# Patient Record
Sex: FEMALE | Race: WHITE
[De-identification: names, ages, dates, MRNs, and addresses within clinical notes are randomized per-mention and may not be internally consistent; named-entity substitution may affect disease eponyms.]

---

## 2018-04-22 ENCOUNTER — HOSPITAL ENCOUNTER (EMERGENCY)
Dept: HOSPITAL 88 - ER | Age: 78
Discharge: HOME | End: 2018-04-22
Payer: COMMERCIAL

## 2018-04-22 VITALS — BODY MASS INDEX: 43.36 KG/M2 | HEIGHT: 64 IN | WEIGHT: 254 LBS

## 2018-04-22 DIAGNOSIS — L03.313: Primary | ICD-10-CM

## 2018-04-22 DIAGNOSIS — Z95.1: ICD-10-CM

## 2018-04-22 DIAGNOSIS — L02.213: ICD-10-CM

## 2018-04-22 PROCEDURE — 99282 EMERGENCY DEPT VISIT SF MDM: CPT

## 2018-12-30 ENCOUNTER — HOSPITAL ENCOUNTER (INPATIENT)
Dept: HOSPITAL 88 - ER | Age: 78
LOS: 5 days | Discharge: ANOTHER HOSPITAL | DRG: 441 | End: 2019-01-04
Attending: INTERNAL MEDICINE | Admitting: INTERNAL MEDICINE
Payer: MEDICARE

## 2018-12-30 VITALS — HEIGHT: 64 IN | BODY MASS INDEX: 47.29 KG/M2 | WEIGHT: 277.01 LBS

## 2018-12-30 DIAGNOSIS — R18.8: ICD-10-CM

## 2018-12-30 DIAGNOSIS — R41.0: ICD-10-CM

## 2018-12-30 DIAGNOSIS — R53.81: ICD-10-CM

## 2018-12-30 DIAGNOSIS — N18.3: ICD-10-CM

## 2018-12-30 DIAGNOSIS — I25.10: ICD-10-CM

## 2018-12-30 DIAGNOSIS — E87.1: ICD-10-CM

## 2018-12-30 DIAGNOSIS — K75.81: Primary | ICD-10-CM

## 2018-12-30 DIAGNOSIS — N39.0: ICD-10-CM

## 2018-12-30 DIAGNOSIS — E66.9: ICD-10-CM

## 2018-12-30 DIAGNOSIS — R19.7: ICD-10-CM

## 2018-12-30 DIAGNOSIS — B96.20: ICD-10-CM

## 2018-12-30 DIAGNOSIS — K72.00: ICD-10-CM

## 2018-12-30 DIAGNOSIS — Z82.49: ICD-10-CM

## 2018-12-30 DIAGNOSIS — Z95.1: ICD-10-CM

## 2018-12-30 DIAGNOSIS — K76.6: ICD-10-CM

## 2018-12-30 DIAGNOSIS — N17.9: ICD-10-CM

## 2018-12-30 DIAGNOSIS — G62.9: ICD-10-CM

## 2018-12-30 LAB
ALBUMIN SERPL-MCNC: 2.3 G/DL (ref 3.5–5)
ALBUMIN/GLOB SERPL: 0.7 {RATIO} (ref 0.8–2)
ALP SERPL-CCNC: 221 IU/L (ref 40–150)
ALT SERPL-CCNC: 274 IU/L (ref 0–55)
ANION GAP SERPL CALC-SCNC: 15.9 MMOL/L (ref 8–16)
BACTERIA URNS QL MICRO: (no result) /HPF
BASOPHILS # BLD AUTO: 0.1 10*3/UL (ref 0–0.1)
BASOPHILS NFR BLD AUTO: 0.6 % (ref 0–1)
BILIRUB UR QL: (no result)
BNP BLD-MCNC: 418.4 PG/ML (ref 0–100)
BUN SERPL-MCNC: 42 MG/DL (ref 7–26)
BUN/CREAT SERPL: 27 (ref 6–25)
CALCIUM SERPL-MCNC: 8.5 MG/DL (ref 8.4–10.2)
CHLORIDE SERPL-SCNC: 100 MMOL/L (ref 98–107)
CLARITY UR: (no result)
CO2 SERPL-SCNC: 20 MMOL/L (ref 22–29)
COLOR UR: YELLOW
DEPRECATED NEUTROPHILS # BLD AUTO: 6.7 10*3/UL (ref 2.1–6.9)
DEPRECATED RBC URNS MANUAL-ACNC: (no result) /HPF (ref 0–5)
EGFRCR SERPLBLD CKD-EPI 2021: 33 ML/MIN (ref 60–?)
EOSINOPHIL # BLD AUTO: 0.2 10*3/UL (ref 0–0.4)
EOSINOPHIL NFR BLD AUTO: 1.7 % (ref 0–6)
EPI CELLS URNS QL MICRO: (no result) /LPF
ERYTHROCYTE [DISTWIDTH] IN CORD BLOOD: 16.4 % (ref 11.7–14.4)
GLOBULIN PLAS-MCNC: 3.5 G/DL (ref 2.3–3.5)
GLUCOSE SERPLBLD-MCNC: 102 MG/DL (ref 74–118)
HCT VFR BLD AUTO: 37.4 % (ref 34.2–44.1)
HGB BLD-MCNC: 12.3 G/DL (ref 12–16)
KETONES UR QL STRIP.AUTO: (no result)
LEUKOCYTE ESTERASE UR QL STRIP.AUTO: (no result)
LIPASE SERPL-CCNC: 66 U/L (ref 8–78)
LYMPHOCYTES # BLD: 1.9 10*3/UL (ref 1–3.2)
LYMPHOCYTES NFR BLD AUTO: 18.3 % (ref 18–39.1)
MCH RBC QN AUTO: 31.9 PG (ref 28–32)
MCHC RBC AUTO-ENTMCNC: 32.9 G/DL (ref 31–35)
MCV RBC AUTO: 96.9 FL (ref 81–99)
MONOCYTES # BLD AUTO: 1.6 10*3/UL (ref 0.2–0.8)
MONOCYTES NFR BLD AUTO: 15.5 % (ref 4.4–11.3)
NEUTS SEG NFR BLD AUTO: 63.7 % (ref 38.7–80)
NITRITE UR QL STRIP.AUTO: NEGATIVE
PLATELET # BLD AUTO: 112 X10E3/UL (ref 140–360)
POTASSIUM SERPL-SCNC: 4.9 MMOL/L (ref 3.5–5.1)
PROT UR QL STRIP.AUTO: (no result)
RBC # BLD AUTO: 3.86 X10E6/UL (ref 3.6–5.1)
SODIUM SERPL-SCNC: 131 MMOL/L (ref 136–145)
SP GR UR STRIP: 1.02 (ref 1.01–1.02)
UROBILINOGEN UR STRIP-MCNC: 1 MG/DL (ref 0.2–1)
WBC #/AREA URNS HPF: (no result) /HPF (ref 0–5)

## 2018-12-30 PROCEDURE — 82140 ASSAY OF AMMONIA: CPT

## 2018-12-30 PROCEDURE — 83690 ASSAY OF LIPASE: CPT

## 2018-12-30 PROCEDURE — 80053 COMPREHEN METABOLIC PANEL: CPT

## 2018-12-30 PROCEDURE — 87086 URINE CULTURE/COLONY COUNT: CPT

## 2018-12-30 PROCEDURE — 76770 US EXAM ABDO BACK WALL COMP: CPT

## 2018-12-30 PROCEDURE — 36415 COLL VENOUS BLD VENIPUNCTURE: CPT

## 2018-12-30 PROCEDURE — 74470 X-RAY EXAM OF KIDNEY LESION: CPT

## 2018-12-30 PROCEDURE — 83735 ASSAY OF MAGNESIUM: CPT

## 2018-12-30 PROCEDURE — 80076 HEPATIC FUNCTION PANEL: CPT

## 2018-12-30 PROCEDURE — 87186 SC STD MICRODIL/AGAR DIL: CPT

## 2018-12-30 PROCEDURE — 82105 ALPHA-FETOPROTEIN SERUM: CPT

## 2018-12-30 PROCEDURE — 97139 UNLISTED THERAPEUTIC PX: CPT

## 2018-12-30 PROCEDURE — 83605 ASSAY OF LACTIC ACID: CPT

## 2018-12-30 PROCEDURE — 74176 CT ABD & PELVIS W/O CONTRAST: CPT

## 2018-12-30 PROCEDURE — 82570 ASSAY OF URINE CREATININE: CPT

## 2018-12-30 PROCEDURE — 99285 EMERGENCY DEPT VISIT HI MDM: CPT

## 2018-12-30 PROCEDURE — 81001 URINALYSIS AUTO W/SCOPE: CPT

## 2018-12-30 PROCEDURE — 76937 US GUIDE VASCULAR ACCESS: CPT

## 2018-12-30 PROCEDURE — 51700 IRRIGATION OF BLADDER: CPT

## 2018-12-30 PROCEDURE — 87493 C DIFF AMPLIFIED PROBE: CPT

## 2018-12-30 PROCEDURE — 83880 ASSAY OF NATRIURETIC PEPTIDE: CPT

## 2018-12-30 PROCEDURE — 93005 ELECTROCARDIOGRAM TRACING: CPT

## 2018-12-30 PROCEDURE — 36556 INSERT NON-TUNNEL CV CATH: CPT

## 2018-12-30 PROCEDURE — 84300 ASSAY OF URINE SODIUM: CPT

## 2018-12-30 PROCEDURE — 85610 PROTHROMBIN TIME: CPT

## 2018-12-30 PROCEDURE — 80048 BASIC METABOLIC PNL TOTAL CA: CPT

## 2018-12-30 PROCEDURE — 85730 THROMBOPLASTIN TIME PARTIAL: CPT

## 2018-12-30 PROCEDURE — 84156 ASSAY OF PROTEIN URINE: CPT

## 2018-12-30 PROCEDURE — 85025 COMPLETE CBC W/AUTO DIFF WBC: CPT

## 2018-12-30 PROCEDURE — 96361 HYDRATE IV INFUSION ADD-ON: CPT

## 2018-12-30 PROCEDURE — 71045 X-RAY EXAM CHEST 1 VIEW: CPT

## 2018-12-30 PROCEDURE — 82248 BILIRUBIN DIRECT: CPT

## 2018-12-30 PROCEDURE — 71046 X-RAY EXAM CHEST 2 VIEWS: CPT

## 2018-12-30 NOTE — XMS REPORT
Clinical Summary

                             Created on: 2018



Merly Phoenix

External Reference #: [id number]

: 1940

Sex: Female



Demographics







                          Address                   [address] [place]  [address]

 

                          Home Phone                [phone]

 

                          Preferred Language        English

 

                          Marital Status            Unknown

 

                          Evangelical Affiliation     Confucianist

 

                          Race                      White

 

                          Ethnic Group              Non-





Author







                          Author                    SANDRA D[employer]er

 

                          Organization              [facility] [employer] [facility]

 

                          Address                   Unknown

 

                          Phone                     Unavailable







Support







                Name            Relationship    Address         Phone

 

                    [employer]       [employer]                [address] [place]  [address]               [phone]







Care Team Providers







                    Care Team Member Name    Role                Phone

 

                    Dennys Dior MD    PCP                 [phone]







Allergies

No Known Allergies



Medications







                          End Date                  Status



              Medication     Sig          Dispensed     Refills      Start  



                                         Date  

 

                                                    Active



                     aspirin 81 MG chewable     Take 81 mg by       0   



                           tablet                    mouth daily.     

 

                                                    Active



                     atorvastatin (LIPITOR) 80     Take 80 mg by       0   



                           MG tablet                 mouth daily.     

 

                                                    Active



                     budesonide-formoterol     Inhale 2            0   



                           (SYMBICORT) 160-4.5       puffs by     



                           mcg/actuation inhaler     mouth via     



                                         inhaler 2     



                                         (two) times     



                                         daily.     

 

                                                    Active



                     gabapentin (NEURONTIN)     Take 300 mg         0   



                           300 MG capsule            by mouth 3     



                                         (three) times     



                                         daily .     

 

                                                    Active



                     ranitidine (ZANTAC) 150     Take 150 mg         0   



                           MG capsule                by mouth 2     



                                         (two) times     



                                         daily.     

 

                                                    Active



                     bisoprolol (ZEBETA) 5 MG     Take 5 mg by        0   



                           tablet                    mouth 2 (two)     



                                         times daily.     

 

                                                    Active



                     lactulose (CHRONULAC) 10     Take 20 g by        0   



                           gram/15 mL (15 mL)        mouth 2 (two)     



                           solution                  times daily.     

 

                                                    Active



                     cholecalciferol, vitamin     Take 1 tablet       0   



                           D3, 5,000 unit Tab        by mouth     



                                         daily.     

 

                                                    Active



                     cyanocobalamin (VITAMIN     Take 1 tablet       0   



                           B-12) 1000 MCG tablet     by mouth     



                                         daily.     

 

                                                    Active



                     C,E,zinc,copper     Take 1 tablet       0   



                           11/qhkce9h/lut (OCUVITE     by mouth     



                           ADULT 50 PLUS ORAL)       daily.     

 

                                                    Active



                 latanoprost (XALATAN)     Place 1 drop      0               /201  



                     0.005 % ophthalmic     into both           8  



                           solution                  eyes daily.     

 

                                                    Active



                 loratadine (CLARITIN) 10     Take 1 tablet      0               /201  



                     mg tablet           by mouth            6  



                                         daily as     



                                         needed for     



                                         Allergies.     

 

                                                    Active



                 nitroglycerin (NITROSTAT)     Take 1 tablet      0                 



                     0.4 MG SL tablet     by mouth as         8  



                                         needed for     



                                         Chest pain     



                                         Dissolve one     



                                         to two     



                                         tablets under     



                                         the tongue as     



                                         needed up to     



                                         3 times. If     



                                         not relieved,     



                                         please call     



                                         [phone].     

 

                          2018                Discontinued



                     acetaminophen-codeine     Take 1 tablet       0   



                           (TYLENOL #3) 300-30 mg     by mouth     



                           per tablet                every 4     



                                         (four) hours     



                                         as needed for     



                                         Pain.     

 

                          2018                Discontinued



                     clopidogrel (PLAVIX) 75     Take 75 mg by       0   



                           mg tablet                 mouth daily.     

 

                          2018                Discontinued



                     losartan-hydroCHLOROthiaz     Take 1 tablet       0   



                           pa (HYZAAR) 50-12.5 mg     by mouth     



                           per tablet                daily.     

 

                          2018                Discontinued



                     spironolactone      Take 25 mg by       0   



                           (ALDACTONE) 25 MG tablet     mouth daily.     

 

                          2018                Discontinued



                 ondansetron (ZOFRAN ODT)     Take 4 mg by      0               10/11/201  



                     4 MG disintegrating     mouth every 8       8  



                           tablet                    (eight) hours     



                                         as needed for     



                                         Nausea.     

 

                          2018                



              levoFLOXacin (LEVAQUIN)     Take 1 tablet     2 tablet     0              



                     250 MG tablet       (250 mg             8  



                                         total) by     



                                         mouth daily     



                                         for 2 days.     







Active Problems







 



                           Problem                   Noted Date

 

 



                           Hepatic encephalopathy     2018

 

 



                           Visual hallucinations     2018

 

 



                           Acute UTI                 2018

 

 



                           Acute vomiting            2018

 

 



                           Cirrhosis                 2018







Encounters







                          Care Team                 Description



                     Date                Type                Specialty  

 

                                        



German Abdalla MD                         Liver lesion; 

Abnormal ultrasound



                     2018          Hospital            Radiology  



                                         Encounter   

 

                                        



German Abdalla MD                         Liver lesion (Primary Dx); 

Abnormal ultrasound



                     12/10/2018          Outside Orders      Radiology  

 

                                                     



                           2018                Travel   

 

                                        



Gracie Carrillo MD Udayamurthy, Anita, MD                  Acute vomiting (Primary Dx); 

Cirrhosis of liver without ascites, unspecified hepatic cirrhosis type (HCC); 

Hepatic encephalopathy (HCC); 

Visual hallucinations; 

Intractable cyclical vomiting with nausea; 

Tongue wound, initial encounter; 

Serotonin syndrome; 

Acute urinary tract infection; 

Acute UTI



                     2018          Hospital            General Internal Medicine  



                           -                         Encounter   



                                         2018          Orders Only         General Internal Medicine  

 

                                                     



                           2018                Travel   



after 2017



Family History







   



                 Medical History     Relation        Name            Comments

 

   



                           Aneurysm                  Father  

 

   



                           Cancer                    Mother  









   



                 Relation        Name            Status          Comments

 

   



                                         Father   

 

   



                                         Mother   







Social History







                                        Date



                 Tobacco Use     Types           Packs/Day       Years Used 

 

                                         



                                         Never Smoker    

 

    



                                         Smokeless Tobacco: Never   



                                         Used   









   



                 Alcohol Use     Drinks/Week     oz/Week         Comments

 

   



                                         No   









  



                     Alcohol Habits      Answer              Date Recorded

 

  



                     How often do you have a drink containing alcohol?     Never               2018

 

  



                           How many drinks containing alcohol do you have on     Not asked 



                                         a typical day when you are drinking?  

 

  



                           How often do you have six or more drinks on one     Not asked 



                                         occasion?  









 



                           Sex Assigned at Birth     Date Recorded

 

 



                                         Not on file 









                                        Industry



                           Job Start Date            Occupation 

 

                                        Not on file



                           Not on file               Not on file 









                                        Travel End



                           Travel History            Travel Start 

 





                                         No recent travel history available.







Last Filed Vital Signs







                                        Time Taken



                           Vital Sign                Reading 

 

                                        2018 10:41 AM CST



                           Blood Pressure            134/59 

 

                                        2018 10:41 AM CST



                           Pulse                     77 

 

                                        2018 10:41 AM CST



                           Temperature               36.6 C (97.8 F) 

 

                                        2018 10:41 AM CST



                           Respiratory Rate          18 

 

                                        2018 10:41 AM CST



                           Oxygen Saturation         94% 

 

                                        2018 10:11 PM CST



                           Inhaled Oxygen            21% 



                                         Concentration  

 

                                        2018 11:14 PM CST



                           Weight                    113.4 kg (250 lb) 

 

                                        2018 11:14 PM CST



                           Height                    162.6 cm (5' 4") 

 

                                        2018 11:14 PM CST



                           Body Mass Index           42.91 







Plan of Treatment





Not on file



Procedures







                                        Comments



                 Procedure Name     Priority        Date/Time       Associated Diagnosis 

 

                                        



Results for this procedure are in the results section.



                 MR ABDOMEN WITH/WITHOUT     Routine         2018      Liver lesion 



                     IV CONTRAST         3:20 PM CST         Abnormal ultrasound 

 

                                        



Results for this procedure are in the results section.



                     POCT-CREATININE     Routine             2018  



                                         2:29 PM CST  

 

                                        



Results for this procedure are in the results section.



                     AMMONIA             Routine             2018  



                                         5:13 AM CST  

 

                                        



Results for this procedure are in the results section.



                     HEPATIC FUNCTION PANEL     Routine             2018  



                                         5:13 AM CST  

 

                                        



Results for this procedure are in the results section.



                     BASIC METABOLIC PANEL (7)     Routine             2018  



                                         5:13 AM CST  

 

                                        



Results for this procedure are in the results section.



                     CBC W/PLT COUNT & AUTO     Routine             2018  



                           DIFFERENTIAL              5:12 AM CST  

 

                                        



Results for this procedure are in the results section.



                     CBC W/PLT COUNT & AUTO     Routine             2018  



                           DIFFERENTIAL              5:12 AM CST  

 

                                        



Results for this procedure are in the results section.



                     POCT-LACTIC ACID, VENOUS     Routine             2018  



                                         7:16 AM CST  

 

                                        



Results for this procedure are in the results section.



                     CBC W/PLT COUNT & AUTO     Routine             2018  



                           DIFFERENTIAL              5:21 AM CST  

 

                                        



Results for this procedure are in the results section.



                     CBC W/PLT COUNT & AUTO     Routine             2018  



                           DIFFERENTIAL              5:21 AM CST  

 

                                        



Results for this procedure are in the results section.



                     HEPATIC FUNCTION PANEL     Routine             2018  



                                         5:21 AM CST  

 

                                        



Results for this procedure are in the results section.



                     BASIC METABOLIC PANEL (7)     Routine             2018  



                                         5:21 AM CST  

 

                                        



Results for this procedure are in the results section.



                     US ABDOMEN COMPLETE     STAT                2018  



                                         4:10 AM CST  

 

                                        



Results for this procedure are in the results section.



                     XR CHEST 1 VIEW     STAT                2018  



                           PORTABLE/BEDSIDE          3:27 AM CST  

 

                                        



Results for this procedure are in the results section.



                     CT BRAIN WITHOUT IV     STAT                2018  



                           CONTRAST                  2:32 AM CST  

 

                                        



Results for this procedure are in the results section.



                     APTT                STAT                2018  



                                         12:42 AM CST  

 

                                        



Results for this procedure are in the results section.



                     PROTHROMBIN TIME/INR     STAT                2018  



                                         12:42 AM CST  

 

                                        



Results for this procedure are in the results section.



                     AMMONIA             STAT                2018  



                                         12:35 AM CST  

 

                                        



Results for this procedure are in the results section.



                     CBC W/PLT COUNT & AUTO     STAT                2018  



                           DIFFERENTIAL              12:06 AM CST  

 

                                        



Results for this procedure are in the results section.



                     BLOOD GAS, VENOUS     STAT                2018  



                                         12:06 AM CST  

 

                                        



Results for this procedure are in the results section.



                     LACTIC ACID, VENOUS,     STAT                2018  



                           WHOLE BLOOD               12:06 AM CST  

 

                                        



Results for this procedure are in the results section.



                     PHOSPHORUS          STAT                2018  



                                         12:06 AM CST  

 

                                        



Results for this procedure are in the results section.



                     MAGNESIUM           STAT                2018  



                                         12:06 AM CST  

 

                                        



Results for this procedure are in the results section.



                     HEPATIC FUNCTION PANEL     STAT                2018  



                                         12:06 AM CST  

 

                                        



Results for this procedure are in the results section.



                     BASIC METABOLIC PANEL (7)     STAT                2018  



                                         12:06 AM CST  

 

                                        



Results for this procedure are in the results section.



                     CBC W/PLT COUNT & AUTO     STAT                2018  



                           DIFFERENTIAL              12:06 AM CST  

 

                                        



Results for this procedure are in the results section.



                     URINALYSIS W/ REFLEX     STAT                2018  



                           URINE CULTURE             12:01 AM CST  

 

                                        



Results for this procedure are in the results section.



                     URINE CULTURE       STAT                2018  



                                         12:01 AM CST  

 

                                        



Results for this procedure are in the results section.



                     ED ECG INTERPRETATION     Routine             2018  



                                         11:30 PM CST  

 

                                         



                     ECG 12-LEAD         Routine             2018  



                                         11:21 PM CST  

 

  



                                         Procedure



                                         Note -



                                         Interface,



                                         External



                                         Ris In -



                                         2018



                                         11:35 PM



                                         CST



                                         Ventricula



                                         r Rate 52



                                         BPM



                                         Atrial



                                         Rate 52



                                         BPM



                                         P-R



                                         Interval



                                         212 ms



                                         QRS



                                         Duration



                                         90 ms



                                         Q-T



                                         Interval



                                         506 ms



                                         QTC



                                         Calculatio



                                         n(Bazett)



                                         470 ms



                                         P Axis 72



                                         degrees



                                         R Axis 56



                                         degrees



                                         T Axis 1



                                         degrees





                                         Sinus



                                         bradycardi



                                         a with 1st



                                         degree A-V



                                         block



                                         Nonspecifi



                                         c ST and T



                                         wave



                                         abnormalit



                                         y



                                         Prolonged



                                         QT



                                         Abnormal



                                         ECG



                                         No



                                         previous



                                         ECGs



                                         available

 

                                        



Results for this procedure are in the results section.



                     ECG 12-LEAD         STAT                2018  



                                         11:21 PM CST  



after 2017



Results

* MR abdomen without & with IV contrast (2018  3:20 PM CST)





 



                           Narrative                 Performed At

 

 



                           FINAL REPORT              [facility]



                                         PATIENT ID: [id number] 



                                         MRI of the abdomen dated 2018 



                                         Comment: Multiplanar T1 and T2-weighted images of the abdomen, 



                                         postcontrast axial and coronal T1-weighted images of the abdomen were 



                                         obtained. 



                                         Liver is cirrhotic liver is cirrhotic in appearance with irregular 



                                         margins. No suspicious mass or abnormal enhancement is seen in the 



                                         liver. A 1 cm cyst is seen in the segment to 3 of the liver. 



                                         Spleen is normal in size. 



                                         The splenic, spleen mesenteric, portal, and hepatic veins are patent. 



                                         Main portal vein measures approximately 9 mm in diameter. No portal 



                                         vein thrombosis is seen. 



                                         Gallbladder is not visualized. There is mild biliary dilatation. 



                                         Common bile duct measures 9 mm in diameter. 



                                         Pancreas and adrenals are unremarkable. Both kidneys are normal in 



                                         size and functioning. A 2.2 cm cyst is seen in the midpole right 



                                         kidney. 



                                         There is trace amount of ascites is seen in the abdomen. 



                                         IMPRESSION: 



                                         1. Cirrhosis. 



                                         2. No suspicious hepatic mass. 



                                         3. Liver and right renal cysts. 



                                         4. Trace ascites. 



                                         Signed: Cuco Ballesteros MD 



                                         Report Verified Date/Time:2018 18:05:21 



                                         Reading Location: [facility] B1 C013Y CT Body Reading Room 



                                         Electronically signed by: CUCO BALLESTEROS M.D. on 2018 06:05 PM

 









                                        Procedure Note

 

                                        



Interface, External Ris In - 2018  6:07 PM CST



FINAL REPORT

 

PATIENT ID:   [id number]

 

MRI of the abdomen dated 2018

 

Comment: Multiplanar T1 and T2-weighted images of the abdomen, 

postcontrast axial and coronal T1-weighted images of the abdomen were 

obtained.

 

Liver is cirrhotic liver is cirrhotic in appearance with irregular 

margins. No suspicious mass or abnormal enhancement is seen in the 

liver. A 1 cm cyst is seen in the segment to 3 of the liver.

 

Spleen is normal in size.

 

The splenic, spleen mesenteric, portal, and hepatic veins are patent. 

Main portal vein measures approximately 9 mm in diameter. No portal 

vein thrombosis is seen.

 

Gallbladder is not visualized. There is mild biliary dilatation. 

Common bile duct measures 9 mm in diameter.

 

Pancreas and adrenals are unremarkable. Both kidneys are normal in 

size and functioning. A 2.2 cm cyst is seen in the midpole right 

kidney.

 

There is trace amount of ascites is seen in the abdomen.

 

IMPRESSION:

                                        1. Cirrhosis.

                                        2. No suspicious hepatic mass.

                                        3. Liver and right renal cysts.

                                        4. Trace ascites.

 

Signed: Cuco Ballesteros MD

Report Verified Date/Time:  2018 18:05:21

 

Reading Location: [facility] C013Y CT Body Reading Room

      Electronically signed by: CUCO BALLESTEROS M.D. on 2018 06:05 PM

 









   



                 Performing Organization     Address         City/[facility]/Zipcode     Phone Number

 

   



                                         [facility]   





* POC-Creatinine (2018  2:29 PM CST)





   



                 Component       Value           Ref Range       Performed At

 

   



                 POC-Creatinine     0.9Comment: TESTED AT [facility]     0.6 - 1.3 mg/dL     [address]



                                         [place] [address]  

 

   



                 POC-EGFR        61              mL/min/1.73M2     [facility]













                                         Specimen

 





                                         Blood









 



                           Narrative                 Performed At

 

 



                                         This result has an attachment that is not available. 









   



                 Performing Organization     Address         City/State/Zipcode     Phone Number

 

   



                 [address] [address] [phone]





                                         [facility]   





* Ammonia (2018  5:13 AM CST)



Only the most recent of 2 results within the time period is included.





   



                 Component       Value           Ref Range       Performed At

 

   



                 Ammonia         64              18 - 72 mol/L     [facility]













                                         Specimen

 





                                         Blood









   



                 Performing Organization     Address         City/[facility]/Zipcode     Phone Number

 

   



                 [facility]     [address] [place] [address]     [phone]





                                         [facility]   





* Hepatic function panel (2018  5:13 AM CST)



Only the most recent of 3 results within the time period is included.





   



                 Component       Value           Ref Range       Performed At

 

   



                 Protein, Total     6.2             6.0 - 8.3 gm/dL     [facility]

 

   



                 Albumin         2.8 (L)         3.5 - 5.0 g/dL     [facility]

 

   



                 Total Bilirubin     3.3 (H)         0.2 - 1.2 mg/dL     [facility]

 

   



                 Bilirubin, Direct     1.0 (H)         0.1 - 0.5 mg/dL     [facility]

 

   



                 Alkaline Phosphatase     139             40 - 150 U/L     [facility]

 

   



                 AST             84 (H)          5 - 34 U/L      [facility]

 

   



                 ALT             50              6 - 55 U/L      [facility]













                                         Specimen

 





                                         Blood









 



                           Narrative                 Performed At

 

 



                           Specimen moderately icteric     [facility]









   



                 Performing Organization     Address         City/State/Zipcode     Phone Number

 

   



                 [facility]     [address] [place] [address] [phone]





                                         MEDICAL CENTER   





* Basic metabolic panel (2018  5:13 AM CST)



Only the most recent of 3 results within the time period is included.





   



                 Component       Value           Ref Range       Performed At

 

   



                 Sodium          137             136 - 145 meq/L     [facility]

 

   



                 Potassium       3.8             3.5 - 5.1 meq/L     [facility]

 

   



                 Chloride        108 (H)         98 - 107 meq/L     [facility]

 

   



                 CO2             20 (L)          22 - 29 meq/L     [facility]

 

   



                 BUN             20              7 - 21 mg/dL     [facility]

 

   



                 Creatinine      1.31 (H)        0.57 - 1.25 mg/dL     [facility]

 

   



                 Glucose         101             70 - 105 mg/dL     [facility]

 

   



                 Calcium         8.5             8.4 - 10.2 mg/dL     [facility]

 

   



                 EGFR            39Comment: ESTIMATED GFR IS     mL/min/1.73 sq m     [facility]



                           NOT AS ACCURATE AS CREATININE      [facility]



                                         CLEARANCE IN PREDICTING  



                                         GLOMERULAR FILTRATION RATE.  



                                         ESTIMATED GFR IS NOT  



                                         APPLICABLE FOR DIALYSIS  



                                         PATIENTS.  













                                         Specimen

 





                                         Blood









 



                           Narrative                 Performed At

 

 



                           Specimen moderately icteric     [facility]









   



                 Performing Organization     Address         City/State/Zipcode     Phone Number

 

   



                 [facility]     [address] [place] [address] [phone]





                                         MEDICAL CENTER   





* CBC with platelet count + automated diff (2018  5:12 AM CST)



Only the most recent of 3 results within the time period is included.





   



                 Component       Value           Ref Range       Performed At

 

   



                 WBC             5.9             3.5 - 10.5 K/L     [facility]

 

   



                 RBC             3.62 (L)        3.93 - 5.22 M/L     [facility]

 

   



                 Hemoglobin      11.7            11.2 - 15.7 GM/DL     [facility]

 

   



                 Hematocrit      35.7            34.1 - 44.9 %     [facility]

 

   



                 MCV             98.6 (H)        79.4 - 94.8 fL     [facility]

 

   



                 MCH             32.3 (H)        25.6 - 32.2 pg     [facility]

 

   



                 MCHC            32.8            32.2 - 35.5 GM/DL     [facility]

 

   



                 RDW             15.2 (H)        11.7 - 14.4 %     [facility]

 

   



                 Platelets       87 (L)          150 - 450 K/CU MM     [facility]

 

   



                 MPV             11.4            9.4 - 12.3 fL     [facility]

 

   



                 nRBC            0               0 - 0 /100 WBC     [facility]

 

   



                 % Neutros       63              %               [facility]

 

   



                 % Lymphs        19              %               [facility]

 

   



                 % Monos         16              %               [facility]

 

   



                 % Eos           2               %               [facility]

 

   



                 % Baso          1               %               [facility]

 

   



                 # Neutros       3.69            1.56 - 6.13 K/L     [facility]

 

   



                 # Lymphs        1.09 (L)        1.18 - 3.74 K/L     [facility]

 

   



                 # Monos         0.96 (H)        0.24 - 0.36 K/L     [facility]

 

   



                 # Eos           0.09            0.04 - 0.36 K/L     [facility]

 

   



                 # Baso          0.04            0.01 - 0.08 K/L     [facility]

 

   



                 Immature        0               0 - 1 %         [facility]



                           [facility]













                                         Specimen

 





                                         Blood









   



                 Performing Organization     Address         City/[facility]     Phone Number

 

   



                 [address] [address]     [phone]





                                         [facility]   





* POC-Lactic Acid, Venous (2018  7:16 AM CST)





   



                 Component       Value           Ref Range       Performed At

 

   



                 POC-Lactic Acid, Venous     2.8 (H)Comment: TESTED AT     0.9 - 1.7 mmol/L     [address]



                                         [id number]  













                                         Specimen

 





                                         Blood









   



                 Performing Organization     Address         City/[facility]     Phone Number

 

   



                 [place] [id number] [place] [address]   





* US abdomen complete (2018  4:10 AM CST)





 



                           Narrative                 Performed At

 

 



                           FINAL REPORT              [facility]



                                         PATIENT ID: [id number] 



                                         Ultrasound of the Abdomen, complete 



                                         Clinical History:Emesis and hepatic encephalopathy. 



                                         Discussion: 



                                         Sonographic evaluation of the abdomen was performed. There is no 



                                         prior study for comparison. 



                                         Liver: 16.4 cm in length at the right midclavicular line.Coarse 



                                         echotexture with nodular contour keeping with cirrhosis.1.0 x 0.9 x 



                                         0.8 cm simple cyst in the left hepatic lobe.Main portal vein is 



                                         patent measuring 1.0 cm in diameter and demonstrates hepatopetal flow. 



                                         Biliary tree:Common duct 10 mm.No intrahepatic biliary ductal 



                                         dilatation. 



                                         Gallbladder: Status post cholecystectomy. 



                                         Pancreas: Partially obscured by bowel gas but the visualized portions 



                                         are unremarkable. 



                                         Ascites:None seen 



                                         Spleen:Normal size measuring 12 x 5 x 4.9 cm. 



                                         Kidneys: Right kidney 10.7 x 4.5 x 4.6 cm with cortical thickness 



                                         of 1.2 cm.Left kidney 11.5 x 5.8 x 5.2 cm with cortical thickness 



                                         of 1.6 cm.Normal cortical echogenicity. 2.3 x 1.8 x 2.3 cm simple 



                                         right lower pole cyst. No shadowing calculus or hydronephrosis. 



                                         IVC/Aorta:Segments partially seen.Unremarkable. 



                                         Impression: 



                                         Status post cholecystectomy. No biliary ductal dilatation. 



                                         Cirrhotic morphology of the liver. Small left hepatic cyst. 



                                         Patent main portal vein. 



                                         Signed: Stephanie Presley MD 



                                         Report Verified Date/Time:2018 04:55:29 



                                         Reading Location: [address] Body Reading Room 



                                         Electronically signed by: STEPHANIE PRESLEY MD on 2018 04:55 





                                         AM 









                                        Procedure Note

 

                                        



Interface, External Ris In - 2018  4:57 AM CST



FINAL REPORT

 

PATIENT ID:   [id number]

 

 

Ultrasound of the Abdomen, complete

 

Clinical History:  Emesis and hepatic encephalopathy.

 

Discussion:

 

Sonographic evaluation of the abdomen was performed. There is no 

prior study for comparison.

 

Liver:   16.4 cm in length at the right midclavicular line.  Coarse 

echotexture with nodular contour keeping with cirrhosis.  1.0 x 0.9 x 

                                        0.8 cm simple cyst in the left hepatic lobe.  Main portal vein is 

patent measuring 1.0 cm in diameter and demonstrates hepatopetal flow.

 

Biliary tree:  Common duct 10 mm.  No intrahepatic biliary ductal 

dilatation.

 

Gallbladder: Status post cholecystectomy.

 

Pancreas: Partially obscured by bowel gas but the visualized portions 

are unremarkable.

 

Ascites:  None seen

 

Spleen:  Normal size measuring 12 x 5 x 4.9 cm.

 

Kidneys:   Right kidney 10.7 x 4.5 x 4.6 cm with cortical thickness 

of 1.2 cm.  Left kidney 11.5 x 5.8 x 5.2 cm with cortical thickness 

of 1.6 cm.  Normal cortical echogenicity. 2.3 x 1.8 x 2.3 cm simple 

right lower pole cyst. No shadowing calculus or hydronephrosis.

 

IVC/Aorta:  Segments partially seen.  Unremarkable.

 

Impression:

 

Status post cholecystectomy. No biliary ductal dilatation.

Cirrhotic morphology of the liver. Small left hepatic cyst.

Patent main portal vein.

 

Signed: Stephanie Presley MD

Report Verified Date/Time:  2018 04:55:29

 

Reading Location: [facility] C013Y CT Body Reading Room

    Electronically signed by: STEPHANIE PRESLEY MD on 2018 04:55 AM

 









   



                 Performing Organization     Address         City/[facility]     Phone Number

 

   



                                         [facility]   





* XR chest 1 view portable / bedside (2018  3:27 AM CST)





 



                           Narrative                 Performed At

 

 



                           FINAL REPORT              [facility]



                                         PATIENT ID: [id number] 



                                         Chest one view. 



                                         Clinical history: Emesis and hepatic encephalopathy. 



                                         Comparison: None. 



                                         Technique: A single frontal view of the chest was obtained. 



                                         Findings: 



                                         The patient is status post median sternotomy. 



                                         The heart is normal in size. The aorta is tortuous. There is no focal 



                                         pulmonary consolidation, pleural effusion or pneumothorax. There is 



                                         no pulmonary edema. 



                                         There is lower cervical spine fusion hardware. 



                                         Impression: 



                                         No focal pulmonary consolidation. 



                                          



                                         Signed: Stephanie Presley MD 



                                         Report Verified Date/Time:2018 03:28:02 



                                         Reading Location: [facility] C013Y CT Body Reading Room 



                                         Electronically signed by: STEPHANIE PRESLEY MD on 2018 03:28 





                                         AM 









                                        Procedure Note

 

                                        



Interface, External Ris In - 2018  3:30 AM CST



FINAL REPORT

 

PATIENT ID:   [id number]

 

 

Chest one view.

 

Clinical history: Emesis and hepatic encephalopathy.

 

Comparison: None.

 

Technique: A single frontal view of the chest was obtained. 

 

Findings:

The patient is status post median sternotomy.

The heart is normal in size. The aorta is tortuous. There is no focal 

pulmonary consolidation, pleural effusion or pneumothorax. There is 

no pulmonary edema.

 

There is lower cervical spine fusion hardware.

 

 Impression:

 

No focal pulmonary consolidation.

 

 

 

   

 

Signed: Stephanie Presley MD

Report Verified Date/Time:  2018 03:28:02

 

Reading Location: [facility] C013Y CT Body Reading Room

    Electronically signed by: STEPHANIE PRESLEY MD on 2018 03:28 AM

 









   



                 Performing Organization     Address         City/[facility]     Phone Number

 

   



                                         [facility]   





* CT brain without IV contrast (2018  2:32 AM CST)





 



                           Narrative                 Performed At

 

 



                           FINAL REPORT              [facility]



                                         PATIENT ID: [id number] 



                                         CLINICAL HISTORY: Confusion, loss of consciousness, emesis, possible 



                                         seizure 



                                         COMPARISON: None 



                                         Multiple axial images of the brain were performed without IV contrast. 



                                         This exam was performed according to our departmental 



                                         dose-optimization program, which includes automated exposure control, 



                                         adjustment of the mA and/or kV according to patient size and/or use 



                                         of the iterative reconstruction technique. 



                                         Intracranial hemorrhage: None. 



                                         Brain parenchyma:Diffuse parenchymal volume loss. Scattered 



                                         subcortical and periventricular non-specific white matter 



                                         hypodensities suggestive of microangiopathy. 



                                         Ventricles, sulci and basal cisterns: Normal for age. 



                                         Extra-axial spaces: Normal. 



                                         Midline shift: None. 



                                         Visualized vasculature: Atherosclerotic calcifications. 



                                         Cranium: No significant findings. 



                                         Skullbase: No significant findings. 



                                         Paranasal sinuses: No significant findings. 



                                         IMPRESSION: 



                                         No definite acute intracranial abnormality. There is no mass lesion, 



                                         intracranial hemorrhage or CT evidence of acute stroke. 



                                         Microvascular and involutional changes. 



                                         Please note that CT is insensitive in the detection of acute ischemia. 



                                         Signed: Moo Leong MD 



                                         Report Verified Date/Time:2018 02:34:19 



                                         Reading Location: [address] Reading Room 



                                         Electronically signed by: MOO LEONG M.D. on 2018 02:34 





                                         AM 









                                        Procedure Note

 

                                        



Interface, External Ris In - 2018  2:36 AM CST



FINAL REPORT

 

PATIENT ID:   [id number]

 

 

CLINICAL HISTORY: Confusion, loss of consciousness, emesis, possible 

seizure

 

COMPARISON: None

 

Multiple axial images of the brain were performed without IV contrast.

 

This exam was performed according to our departmental 

dose-optimization program, which includes automated exposure control, 

adjustment of the mA and/or kV according to patient size and/or use 

of the iterative reconstruction technique. 

 

Intracranial hemorrhage: None.

 

Brain parenchyma:    Diffuse parenchymal volume loss. Scattered 

subcortical and periventricular non-specific white matter 

hypodensities suggestive of microangiopathy.

 

Ventricles, sulci and basal cisterns: Normal for age.

 

Extra-axial spaces: Normal.

 

Midline shift: None.

 

Visualized vasculature: Atherosclerotic calcifications.

 

Cranium: No significant findings.

 

Skullbase: No significant findings.

 

Paranasal sinuses: No significant findings.

 

 

IMPRESSION:

 

No definite acute intracranial abnormality. There is no mass lesion, 

intracranial hemorrhage or CT evidence of acute stroke.

 

Microvascular and involutional changes.

 

Please note that CT is insensitive in the detection of acute ischemia.

 

Signed: Moo Leong MD

Report Verified Date/Time:  2018 02:34:19

 

Reading Location: [address] Reading Room

      Electronically signed by: MOO LEONG M.D. on 2018 02:34 AM

 









   



                 Performing Organization     Address         City/State/Zipcode     Phone Number

 

   



                                         [facility]   





* PTT (aPTT) (2018 12:42 AM CST)





   



                 Component       Value           Ref Range       Performed At

 

   



                 PTT             38.9 (H)        22.5 - 36.0 seconds     [facility]













                                         Specimen

 





                                         Blood









   



                 Performing Organization     Address         City[facility]     Phone Number

 

   



                 [facility]     [address] [place] [id number]     [phone]   





* PT/INR (2018 12:42 AM CST)





   



                 Component       Value           Ref Range       Performed At

 

   



                 Protime         17.0 (H)        11.7 - 14.7 seconds     [facility]

 

   



                 INR             1.4             <=5.9           [facility]













                                         Specimen

 





                                         Blood









 



                           Narrative                 Performed At

 

 



                           RECOMMENDED COUMADIN/WARFARIN INR THERAPY RANGES     [facility]



                           STANDARD DOSE: 2.0 - 3.0 Includes: PROPHYLAXIS for venous thrombosis,     [facility]



                                         systemic embolization; TREATMENT for venous thrombosis and/or pulmonary embolus.

 



                                         HIGH RISK: Target INR is 2.5-3.5 for patients with mechanical heart valves. 









   



                 Performing Organization     Address         City[facility]     Phone Number

 

   



                 [address]   





* Lactic acid, venous, whole blood (2018 12:06 AM CST)





   



                 Component       Value           Ref Range       Performed At

 

   



                 Lactate, Venous     2.7 (H)Comment: Specimen     0.5 - 2.2 mmol/L     [facility]





                           slightly hemolyzed        [facility]













                                         Specimen

 





                                         Blood









 



                           Narrative                 Performed At

 

 



                           Specimen slightly icteric     [facility]









   



                 Performing Organization     Address         City[facility]     Phone Number

 

   



                 [facility]     [address] [place] [address] [phone] [place] [address]   





* Phosphorus (2018 12:06 AM CST)





   



                 Component       Value           Ref Range       Performed At

 

   



                 Phosphorus      2.4             2.3 - 4.7 mg/dL     [facility]













                                         Specimen

 





                                         Blood









   



                 Performing Organization     Address         City/[facility]/Zipcode     Phone Number

 

   



                 [facility]     [address] [place] [id number] [phone]





                                         [facility]ENTER   





* Magnesium (2018 12:06 AM CST)





   



                 Component       Value           Ref Range       Performed At

 

   



                 Magnesium       2.3             1.6 - 2.6 mg/dL     [facility]













                                         Specimen

 





                                         Blood









   



                 Performing Organization     Address         City/State/Zipcode     Phone Number

 

   



                 [address] [address] [phone]





                                         MEDICAL CENTER   





* Blood gas, venous (2018 12:06 AM CST)





   



                 Component       Value           Ref Range       Performed At

 

   



                 pH, Sebastian         7.40            7.32 - 7.42     [facility]

 

   



                 pCO2, Sebastian       44              41 - 51 mmHg     [facility]

 

   



                 pO2, Sebastian        94 (H)          25 - 40 mmHg     [facility]

 

   



                 O2 Sat, Sebastian     97.2 (H)        40.0 - 70.0 %     [facility]

 

   



                 HCO3, Sebastian       27              21 - 29 mmol/L     [facility]

 

   



                 Base Excess, Sebastian     1.6             -2.0 - 3.0 mmol/L     [facility]

 

   



                 Patient Temperature     37.0            C               [facility]

 

   



                 FIO2            21.0            %               [facility]













                                         Specimen

 





                                         Blood









   



                 Performing Organization     Address         City/State/Zipcode     Phone Number

 

   



                 [address] [address] [phone]





                                         MEDICAL CENTER   





* Urinalysis w/Microscopic + Reflex to Culture (2018 12:01 AM CST)





   



                 Component       Value           Ref Range       Performed At

 

   



                     Color, UA           Yellow              [facility]

 

   



                     Clarity, UA         Hazy                [facility]

 

   



                 Specific [place], UA     1.015           1.001 - 1.035     [facility]

 

   



                 pH, UA          6.5             5.0 - 8.0       [facility]

 

   



                 Protein, UA     70 mg/dL (A)     Negative        [facility]

 

   



                 Glucose, UA     Negative        Negative        [facility]

 

   



                 Ketones, UA     Negative        Negative        [facility]

 

   



                 Bilirubin, UA     Negative        Negative        [facility]

 

   



                 Blood, UA       Moderate (A)     Negative        [facility]

 

   



                 Nitrite, UA     Negative        Negative        [facility]

 

   



                 Leukocytes, UA     Large (A)       Negative        [facility]

 

   



                 Urobilinogen, UA     12.0 (H)        0.2 - 1.0 mg/dL     [facility]

 

   



                 RBC, UA         7               /HPF            [facility]

 

   



                 WBC, UA         149             /HPF            [facility]

 

   



                     Bacteria, UA        Many                [facility]

 

   



                 Squam Epithel, UA     8               /HPF            [facility]

 

   



                 Hyaline Casts, UA     10              /LPF            [facility]

 

   



                 Granular Casts, UA     20              /LPF            [facility]

 

   



                           Specimen Source           [facility]













                                         Specimen

 





                                         Urine - Urine, Clean



                                         Catch









   



                 Performing Organization     Address         City/State/Zipcode     Phone Number

 

   



                 [facility]     [address] [place] [address] [phone]





                                         MEDICAL CENTER   





* Urine culture (2018 12:01 AM CST)





   



                 Component       Value           Ref Range       Performed At

 

   



                     Result              RAOULTELLA ORNITHINOLYTICA (A)      [facility]

 

   



                     Result              ENTEROBACTER CLOACAE COMPLEX      [facility]



                           ()                       [facility]













                                         Specimen

 





                                         Urine - Urine, Clean



                                         Catch









                    Antibiotic          Method              Susceptibility



                                         Organism   

 

                    Amikacin                                



<=2: Susceptible



                                         Raoultella   



                                         ornithinolytica   

 

                    Ampicillin + Sulbactam                        



8: Susceptible



                                         Raoultella   



                                         ornithinolytica   

 

                    Aztreonam                               



<=1: Susceptible



                                         Raoultella   



                                         ornithinolytica   

 

                    Cefepime                                



<=1: Susceptible



                                         Raoultella   



                                         ornithinolytica   

 

                    Cefoxitin                               



<=4: Susceptible



                                         Raoultella   



                                         ornithinolytica   

 

                    Ceftazidime                             



<=1: Susceptible



                                         Raoultella   



                                         ornithinolytica   

 

                    Ceftriaxone                             



<=1: Susceptible



                                         Raoultella   



                                         ornithinolytica   

 

                    Ertapenem                               



<=0.5: Susceptible



                                         Raoultella   



                                         ornithinolytica   

 

                    Gentamicin                              



<=1: Susceptible



                                         Raoultella   



                                         ornithinolytica   

 

                    Levofloxacin                            



<=0.12: Susceptible



                                         Raoultella   



                                         ornithinolytica   

 

                    Meropenem                               



<=0.25: Susceptible



                                         Raoultella   



                                         ornithinolytica   

 

                    Nitrofurantoin                          



<=16: Susceptible



                                         Raoultella   



                                         ornithinolytica   

 

                    Piperacillin + Tazobactam                        



<=4: Susceptible



                                         Raoultella   



                                         ornithinolytica   

 

                    Tetracycline                            



<=1: Susceptible



                                         Raoultella   



                                         ornithinolytica   

 

                    Tobramycin                              



<=1: Susceptible



                                         Raoultella   



                                         ornithinolytica   

 

                    Trimethoprim + Sulfamethoxazole                        



<=20: Susceptible



                                         Raoultella   



                                         ornithinolytica   

 

                    Amikacin                                



<=8: Susceptible



                                         Enterobacter cloacae   



                                         complex   

 

                    Ampicillin                              



>16: Resistant



                                         Enterobacter cloacae   



                                         complex   

 

                    Ampicillin + Sulbactam                        



16: Resistant



                                         Enterobacter cloacae   



                                         complex   

 

                    Aztreonam                               



<=1: Susceptible



                                         Enterobacter cloacae   



                                         complex   

 

                    Cefepime                                



<=4: Dose Dependent Susceptible



                                         Enterobacter cloacae   



                                         complex   

 

                    Ceftazidime                             



<=1: Susceptible



                                         Enterobacter cloacae   



                                         complex   

 

                    Ciprofloxacin                           



<=0.5: Susceptible



                                         Enterobacter cloacae   



                                         complex   

 

                    Gentamicin                              



<=2: Susceptible



                                         Enterobacter cloacae   



                                         complex   

 

                    Levofloxacin                            



<=1: Susceptible



                                         Enterobacter cloacae   



                                         complex   

 

                    Meropenem                               



<=0.5: Susceptible



                                         Enterobacter cloacae   



                                         complex   

 

                    Nitrofurantoin                          



>8: Susceptible



                                         Enterobacter cloacae   



                                         complex   

 

                    Piperacillin + Tazobactam                        



<=8: Susceptible



                                         Enterobacter cloacae   



                                         complex   

 

                    Tetracycline                            



>8: Resistant



                                         Enterobacter cloacae   



                                         complex   

 

                    Tobramycin                              



<=2: Susceptible



                                         Enterobacter cloacae   



                                         complex   

 

                    Trimethoprim + Sulfamethoxazole                        



>80: Resistant



                                         Enterobacter cloacae   



                                         complex   

 

 



                                         Comment: Higher doses of



                                         Cefepime such as 1g every



                                         8 hours are recommended



                                         with an interpretation of



                                         Dose Dependent



                                         Susceptible









   



                 Performing Organization     Address         City/State/Zipcode     Phone Number

 

   



                 [facility]     [address] [place] [address] [phone]





                                         MEDICAL CENTER   





* ECG/EKG Interpretation (2018 11:30 PM CST)





 



                           Narrative                 Performed At

 

 



                                         Gracie Carrillo MD 20188:51 AM 



                                         ECG/EKG Interpretation 



                                         Date/Time: 2018 11:21 PM 



                                         Performed by: Gracie Carrillo MD 



                                         Authorized by: Gracie Carrillo MD 



                                         The ECG was interpreted by ED physician. This ECG was not compared with 



                                         previous ECG(s).There was no previous ECG available for comparison. The 



                                         ECG is interpreted as sinus bradycardia. Rate is bradycardic. Heart rate 



                                         is 52 BPM. 



                                         Abnormal conduction noted: pr 0.212 and 1st degree. 



                                         ST segments normal. T waves abnormal. T-wave inversion in lead(s) III and 



                                         V3. T-wave flattening in lead(s) V4, V5 and V6. Axis is normal. 



                                         Other findings include: QTc 0.470and prolonged QTc interval. Clinical 



                                         Impression: abnormal ECGECG reviewed and does not meet STEMI criteria. 



                                         Patient tolerance: Patient tolerated the procedure well with no immediate 



                                         complications 





* ECG 12 lead (2018 11:21 PM CST)





 



                           Narrative                 Performed At

 

 



                           Ventricular Rate 52 BPM     [facility]



                                         Atrial Rate 52 BPM 



                                         P-R Interval 212 ms 



                                         QRS Duration 90 ms 



                                         Q-T Interval 506 ms 



                                         QTC Calculation(Bazett) 470 ms 



                                         P Axis 72 degrees 



                                         R Axis 56 degrees 



                                         T Axis 1 degrees 



                                         Sinus bradycardia with 1st degree A-V block 



                                         Nonspecific ST and T wave abnormality 



                                         Prolonged QT 



                                         Abnormal ECG 



                                         No previous ECGs available 



                                         Confirmed by ELIF WILLAMS MICHAEL ([id number]) on 2018 7:21:49 AM 









                                        Procedure Note

 

                                        



Interface, External Ris In - 2018  7:21 AM CST



Ventricular Rate 52 BPM

Atrial Rate 52 BPM

P-R Interval 212 ms

QRS Duration 90 ms

Q-T Interval 506 ms

QTC Calculation(Bazett) 470 ms

P Axis 72 degrees

R Axis 56 degrees

T Axis 1 degrees



Sinus bradycardia with 1st degree A-V block

Nonspecific ST and T wave abnormality

Prolonged QT

Abnormal ECG

No previous ECGs available

Confirmed by ELIF WILLAMS MICHAEL ([id number]) on 2018 7:21:49 AM









   



                 Performing Organization     Address         City/State/Zipcode     Phone Number

 

   



                                         [facility]   





after 2017



Insurance







     



            Payer      Benefit     Subscriber ID     Type       Phone      Address



                                         Plan /    



                                         Group    

 

     



                     [facility]          xxxxxxxxxxx   



                                         MEDICARE    



                                         ADV    









     



            Guarantor Name     Account     Relation to     Date of     Phone      Billing Address



                     Type                Patient             Birth  

 

     



            Merly Phoenix     Personal/F     Self       1940     [phone] [address]



                     [facility]               (Home)              [place] [address]







Advance Directives





For more information, please contact:



[facility]



[address]



[place] [address] [phone]









                          Date Inactivated          Comments



                           Code Status               Date Activated  

 

                                                     



                           Full Code                 2018  3:05 AM  









  



                           This code status was determined by:     Patient Lab orders entered at [facility]. Pt notified.

## 2018-12-30 NOTE — DIAGNOSTIC IMAGING REPORT
EXAM: CHEST SINGLE (PORTABLE), AP 1 view

INDICATION: Weakness, altered mental status

COMPARISON: None



FINDINGS:

LINES/TUBES: None



LUNGS: No consolidations or edema. 



PLEURA: No effusions or pneumothorax.



HEART AND MEDIASTINUM: Within normal limits for technique. Surgical changes of

coronary artery bypass.



BONES AND SOFT TISSUES: Lower cervical spine surgical hardware. Median

sternotomy wires.



IMPRESSION:

No acute thoracic abnormality.







Signed by: Dr. Rosetta Samuel M.D. on 12/30/2018 10:15 PM

## 2018-12-30 NOTE — XMS REPORT
Clinical Summary

                             Created on: 2018



Merly Phoenix

External Reference #: [id number]

: 1940

Sex: Female



Demographics







                          Address                   [address] [place]  [address]

 

                          Home Phone                [phone]

 

                          Preferred Language        English

 

                          Marital Status            Unknown

 

                          Confucianist Affiliation     Anabaptism

 

                          Race                      White

 

                          Ethnic Group              Non-





Author







                          Author                    SANDRA [employer]

 

                          Organization              [facility] [employer] [facility]

 

                          Address                   Unknown

 

                          Phone                     Unavailable







Support







                Name            Relationship    Address         Phone

 

                    [employer]       [employer]                [address] [place]  [address]               [phone]







Care Team Providers







                    Care Team Member Name    Role                Phone

 

                    Dennys Dior MD    PCP                 [phone]







Allergies

No Known Allergies



Medications







                          End Date                  Status



              Medication     Sig          Dispensed     Refills      Start  



                                         Date  

 

                                                    Active



                     aspirin 81 MG chewable     Take 81 mg by       0   



                           tablet                    mouth daily.     

 

                                                    Active



                     atorvastatin (LIPITOR) 80     Take 80 mg by       0   



                           MG tablet                 mouth daily.     

 

                                                    Active



                     budesonide-formoterol     Inhale 2            0   



                           (SYMBICORT) 160-4.5       puffs by     



                           mcg/actuation inhaler     mouth via     



                                         inhaler 2     



                                         (two) times     



                                         daily.     

 

                                                    Active



                     gabapentin (NEURONTIN)     Take 300 mg         0   



                           300 MG capsule            by mouth 3     



                                         (three) times     



                                         daily .     

 

                                                    Active



                     ranitidine (ZANTAC) 150     Take 150 mg         0   



                           MG capsule                by mouth 2     



                                         (two) times     



                                         daily.     

 

                                                    Active



                     bisoprolol (ZEBETA) 5 MG     Take 5 mg by        0   



                           tablet                    mouth 2 (two)     



                                         times daily.     

 

                                                    Active



                     lactulose (CHRONULAC) 10     Take 20 g by        0   



                           gram/15 mL (15 mL)        mouth 2 (two)     



                           solution                  times daily.     

 

                                                    Active



                     cholecalciferol, vitamin     Take 1 tablet       0   



                           D3, 5,000 unit Tab        by mouth     



                                         daily.     

 

                                                    Active



                     cyanocobalamin (VITAMIN     Take 1 tablet       0   



                           B-12) 1000 MCG tablet     by mouth     



                                         daily.     

 

                                                    Active



                     C,E,zinc,copper     Take 1 tablet       0   



                           11/wxenp3p/lut (OCUVITE     by mouth     



                           ADULT 50 PLUS ORAL)       daily.     

 

                                                    Active



                 latanoprost (XALATAN)     Place 1 drop      0               /201  



                     0.005 % ophthalmic     into both           8  



                           solution                  eyes daily.     

 

                                                    Active



                 loratadine (CLARITIN) 10     Take 1 tablet      0               /201  



                     mg tablet           by mouth            6  



                                         daily as     



                                         needed for     



                                         Allergies.     

 

                                                    Active



                 nitroglycerin (NITROSTAT)     Take 1 tablet      0                 



                     0.4 MG SL tablet     by mouth as         8  



                                         needed for     



                                         Chest pain     



                                         Dissolve one     



                                         to two     



                                         tablets under     



                                         the tongue as     



                                         needed up to     



                                         3 times. If     



                                         not relieved,     



                                         please call     



                                         [phone].     

 

                          2018                Discontinued



                     acetaminophen-codeine     Take 1 tablet       0   



                           (TYLENOL #3) 300-30 mg     by mouth     



                           per tablet                every 4     



                                         (four) hours     



                                         as needed for     



                                         Pain.     

 

                          2018                Discontinued



                     clopidogrel (PLAVIX) 75     Take 75 mg by       0   



                           mg tablet                 mouth daily.     

 

                          2018                Discontinued



                     losartan-hydroCHLOROthiaz     Take 1 tablet       0   



                           pa (HYZAAR) 50-12.5 mg     by mouth     



                           per tablet                daily.     

 

                          2018                Discontinued



                     spironolactone      Take 25 mg by       0   



                           (ALDACTONE) 25 MG tablet     mouth daily.     

 

                          2018                Discontinued



                 ondansetron (ZOFRAN ODT)     Take 4 mg by      0               10/11/201  



                     4 MG disintegrating     mouth every 8       8  



                           tablet                    (eight) hours     



                                         as needed for     



                                         Nausea.     

 

                          2018                



              levoFLOXacin (LEVAQUIN)     Take 1 tablet     2 tablet     0              



                     250 MG tablet       (250 mg             8  



                                         total) by     



                                         mouth daily     



                                         for 2 days.     







Active Problems







 



                           Problem                   Noted Date

 

 



                           Hepatic encephalopathy     2018

 

 



                           Visual hallucinations     2018

 

 



                           Acute UTI                 2018

 

 



                           Acute vomiting            2018

 

 



                           Cirrhosis                 2018







Encounters







                          Care Team                 Description



                     Date                Type                Specialty  

 

                                        



German Abdalla MD                         Liver lesion; 

Abnormal ultrasound



                     2018          Hospital            Radiology  



                                         Encounter   

 

                                        



German Abdalla MD                         Liver lesion (Primary Dx); 

Abnormal ultrasound



                     12/10/2018          Outside Orders      Radiology  

 

                                                     



                           2018                Travel   

 

                                        



Gracie Carrillo MD Udayamurthy, Anita, MD                  Acute vomiting (Primary Dx); 

Cirrhosis of liver without ascites, unspecified hepatic cirrhosis type (HCC); 

Hepatic encephalopathy (HCC); 

Visual hallucinations; 

Intractable cyclical vomiting with nausea; 

Tongue wound, initial encounter; 

Serotonin syndrome; 

Acute urinary tract infection; 

Acute UTI



                     2018          Hospital            General Internal Medicine  



                           -                         Encounter   



                                         2018          Orders Only         General Internal Medicine  

 

                                                     



                           2018                Travel   



after 2017



Family History







   



                 Medical History     Relation        Name            Comments

 

   



                           Aneurysm                  Father  

 

   



                           Cancer                    Mother  









   



                 Relation        Name            Status          Comments

 

   



                                         Father   

 

   



                                         Mother   







Social History







                                        Date



                 Tobacco Use     Types           Packs/Day       Years Used 

 

                                         



                                         Never Smoker    

 

    



                                         Smokeless Tobacco: Never   



                                         Used   









   



                 Alcohol Use     Drinks/Week     oz/Week         Comments

 

   



                                         No   









  



                     Alcohol Habits      Answer              Date Recorded

 

  



                     How often do you have a drink containing alcohol?     Never               2018

 

  



                           How many drinks containing alcohol do you have on     Not asked 



                                         a typical day when you are drinking?  

 

  



                           How often do you have six or more drinks on one     Not asked 



                                         occasion?  









 



                           Sex Assigned at Birth     Date Recorded

 

 



                                         Not on file 









                                        Industry



                           Job Start Date            Occupation 

 

                                        Not on file



                           Not on file               Not on file 









                                        Travel End



                           Travel History            Travel Start 

 





                                         No recent travel history available.







Last Filed Vital Signs







                                        Time Taken



                           Vital Sign                Reading 

 

                                        2018 10:41 AM CST



                           Blood Pressure            134/59 

 

                                        2018 10:41 AM CST



                           Pulse                     77 

 

                                        2018 10:41 AM CST



                           Temperature               36.6 C (97.8 F) 

 

                                        2018 10:41 AM CST



                           Respiratory Rate          18 

 

                                        2018 10:41 AM CST



                           Oxygen Saturation         94% 

 

                                        2018 10:11 PM CST



                           Inhaled Oxygen            21% 



                                         Concentration  

 

                                        2018 11:14 PM CST



                           Weight                    113.4 kg (250 lb) 

 

                                        2018 11:14 PM CST



                           Height                    162.6 cm (5' 4") 

 

                                        2018 11:14 PM CST



                           Body Mass Index           42.91 







Plan of Treatment





Not on file



Procedures







                                        Comments



                 Procedure Name     Priority        Date/Time       Associated Diagnosis 

 

                                        



Results for this procedure are in the results section.



                 MR ABDOMEN WITH/WITHOUT     Routine         2018      Liver lesion 



                     IV CONTRAST         3:20 PM CST         Abnormal ultrasound 

 

                                        



Results for this procedure are in the results section.



                     POCT-CREATININE     Routine             2018  



                                         2:29 PM CST  

 

                                        



Results for this procedure are in the results section.



                     AMMONIA             Routine             2018  



                                         5:13 AM CST  

 

                                        



Results for this procedure are in the results section.



                     HEPATIC FUNCTION PANEL     Routine             2018  



                                         5:13 AM CST  

 

                                        



Results for this procedure are in the results section.



                     BASIC METABOLIC PANEL (7)     Routine             2018  



                                         5:13 AM CST  

 

                                        



Results for this procedure are in the results section.



                     CBC W/PLT COUNT & AUTO     Routine             2018  



                           DIFFERENTIAL              5:12 AM CST  

 

                                        



Results for this procedure are in the results section.



                     CBC W/PLT COUNT & AUTO     Routine             2018  



                           DIFFERENTIAL              5:12 AM CST  

 

                                        



Results for this procedure are in the results section.



                     POCT-LACTIC ACID, VENOUS     Routine             2018  



                                         7:16 AM CST  

 

                                        



Results for this procedure are in the results section.



                     CBC W/PLT COUNT & AUTO     Routine             2018  



                           DIFFERENTIAL              5:21 AM CST  

 

                                        



Results for this procedure are in the results section.



                     CBC W/PLT COUNT & AUTO     Routine             2018  



                           DIFFERENTIAL              5:21 AM CST  

 

                                        



Results for this procedure are in the results section.



                     HEPATIC FUNCTION PANEL     Routine             2018  



                                         5:21 AM CST  

 

                                        



Results for this procedure are in the results section.



                     BASIC METABOLIC PANEL (7)     Routine             2018  



                                         5:21 AM CST  

 

                                        



Results for this procedure are in the results section.



                     US ABDOMEN COMPLETE     STAT                2018  



                                         4:10 AM CST  

 

                                        



Results for this procedure are in the results section.



                     XR CHEST 1 VIEW     STAT                2018  



                           PORTABLE/BEDSIDE          3:27 AM CST  

 

                                        



Results for this procedure are in the results section.



                     CT BRAIN WITHOUT IV     STAT                2018  



                           CONTRAST                  2:32 AM CST  

 

                                        



Results for this procedure are in the results section.



                     APTT                STAT                2018  



                                         12:42 AM CST  

 

                                        



Results for this procedure are in the results section.



                     PROTHROMBIN TIME/INR     STAT                2018  



                                         12:42 AM CST  

 

                                        



Results for this procedure are in the results section.



                     AMMONIA             STAT                2018  



                                         12:35 AM CST  

 

                                        



Results for this procedure are in the results section.



                     CBC W/PLT COUNT & AUTO     STAT                2018  



                           DIFFERENTIAL              12:06 AM CST  

 

                                        



Results for this procedure are in the results section.



                     BLOOD GAS, VENOUS     STAT                2018  



                                         12:06 AM CST  

 

                                        



Results for this procedure are in the results section.



                     LACTIC ACID, VENOUS,     STAT                2018  



                           WHOLE BLOOD               12:06 AM CST  

 

                                        



Results for this procedure are in the results section.



                     PHOSPHORUS          STAT                2018  



                                         12:06 AM CST  

 

                                        



Results for this procedure are in the results section.



                     MAGNESIUM           STAT                2018  



                                         12:06 AM CST  

 

                                        



Results for this procedure are in the results section.



                     HEPATIC FUNCTION PANEL     STAT                2018  



                                         12:06 AM CST  

 

                                        



Results for this procedure are in the results section.



                     BASIC METABOLIC PANEL (7)     STAT                2018  



                                         12:06 AM CST  

 

                                        



Results for this procedure are in the results section.



                     CBC W/PLT COUNT & AUTO     STAT                2018  



                           DIFFERENTIAL              12:06 AM CST  

 

                                        



Results for this procedure are in the results section.



                     URINALYSIS W/ REFLEX     STAT                2018  



                           URINE CULTURE             12:01 AM CST  

 

                                        



Results for this procedure are in the results section.



                     URINE CULTURE       STAT                2018  



                                         12:01 AM CST  

 

                                        



Results for this procedure are in the results section.



                     ED ECG INTERPRETATION     Routine             2018  



                                         11:30 PM CST  

 

                                         



                     ECG 12-LEAD         Routine             2018  



                                         11:21 PM CST  

 

  



                                         Procedure



                                         Note -



                                         Interface,



                                         External



                                         Ris In -



                                         2018



                                         11:35 PM



                                         CST



                                         Ventricula



                                         r Rate 52



                                         BPM



                                         Atrial



                                         Rate 52



                                         BPM



                                         P-R



                                         Interval



                                         212 ms



                                         QRS



                                         Duration



                                         90 ms



                                         Q-T



                                         Interval



                                         506 ms



                                         QTC



                                         Calculatio



                                         n(Bazett)



                                         470 ms



                                         P Axis 72



                                         degrees



                                         R Axis 56



                                         degrees



                                         T Axis 1



                                         degrees





                                         Sinus



                                         bradycardi



                                         a with 1st



                                         degree A-V



                                         block



                                         Nonspecifi



                                         c ST and T



                                         wave



                                         abnormalit



                                         y



                                         Prolonged



                                         QT



                                         Abnormal



                                         ECG



                                         No



                                         previous



                                         ECGs



                                         available

 

                                        



Results for this procedure are in the results section.



                     ECG 12-LEAD         STAT                2018  



                                         11:21 PM CST  



after 2017



Results

* MR abdomen without & with IV contrast (2018  3:20 PM CST)





 



                           Narrative                 Performed At

 

 



                           FINAL REPORT              [facility]



                                         PATIENT ID: [id number] 



                                         MRI of the abdomen dated 2018 



                                         Comment: Multiplanar T1 and T2-weighted images of the abdomen, 



                                         postcontrast axial and coronal T1-weighted images of the abdomen were 



                                         obtained. 



                                         Liver is cirrhotic liver is cirrhotic in appearance with irregular 



                                         margins. No suspicious mass or abnormal enhancement is seen in the 



                                         liver. A 1 cm cyst is seen in the segment to 3 of the liver. 



                                         Spleen is normal in size. 



                                         The splenic, spleen mesenteric, portal, and hepatic veins are patent. 



                                         Main portal vein measures approximately 9 mm in diameter. No portal 



                                         vein thrombosis is seen. 



                                         Gallbladder is not visualized. There is mild biliary dilatation. 



                                         Common bile duct measures 9 mm in diameter. 



                                         Pancreas and adrenals are unremarkable. Both kidneys are normal in 



                                         size and functioning. A 2.2 cm cyst is seen in the midpole right 



                                         kidney. 



                                         There is trace amount of ascites is seen in the abdomen. 



                                         IMPRESSION: 



                                         1. Cirrhosis. 



                                         2. No suspicious hepatic mass. 



                                         3. Liver and right renal cysts. 



                                         4. Trace ascites. 



                                         Signed: Cuco Ballesteros MD 



                                         Report Verified Date/Time:2018 18:05:21 



                                         Reading Location: [facility] B1 C013Y CT Body Reading Room 



                                         Electronically signed by: CUCO BALLESTEROS M.D. on 2018 06:05 PM

 









                                        Procedure Note

 

                                        



Interface, External Ris In - 2018  6:07 PM CST



FINAL REPORT

 

PATIENT ID:   [id number]

 

MRI of the abdomen dated 2018

 

Comment: Multiplanar T1 and T2-weighted images of the abdomen, 

postcontrast axial and coronal T1-weighted images of the abdomen were 

obtained.

 

Liver is cirrhotic liver is cirrhotic in appearance with irregular 

margins. No suspicious mass or abnormal enhancement is seen in the 

liver. A 1 cm cyst is seen in the segment to 3 of the liver.

 

Spleen is normal in size.

 

The splenic, spleen mesenteric, portal, and hepatic veins are patent. 

Main portal vein measures approximately 9 mm in diameter. No portal 

vein thrombosis is seen.

 

Gallbladder is not visualized. There is mild biliary dilatation. 

Common bile duct measures 9 mm in diameter.

 

Pancreas and adrenals are unremarkable. Both kidneys are normal in 

size and functioning. A 2.2 cm cyst is seen in the midpole right 

kidney.

 

There is trace amount of ascites is seen in the abdomen.

 

IMPRESSION:

                                        1. Cirrhosis.

                                        2. No suspicious hepatic mass.

                                        3. Liver and right renal cysts.

                                        4. Trace ascites.

 

Signed: Cuco Ballesteros MD

Report Verified Date/Time:  2018 18:05:21

 

Reading Location: [facility] C013Y CT Body Reading Room

      Electronically signed by: CUCO BALLESTEROS M.D. on 2018 06:05 PM

 









   



                 Performing Organization     Address         City/[facility]/Zipcode     Phone Number

 

   



                                         [facility]   





* POC-Creatinine (2018  2:29 PM CST)





   



                 Component       Value           Ref Range       Performed At

 

   



                 POC-Creatinine     0.9Comment: TESTED AT [facility]     0.6 - 1.3 mg/dL     [address]



                                         [place] [address]  

 

   



                 POC-EGFR        61              mL/min/1.73M2     [facility]













                                         Specimen

 





                                         Blood









 



                           Narrative                 Performed At

 

 



                                         This result has an attachment that is not available. 









   



                 Performing Organization     Address         City/State/Zipcode     Phone Number

 

   



                 [address] [address] [phone]





                                         [facility]   





* Ammonia (2018  5:13 AM CST)



Only the most recent of 2 results within the time period is included.





   



                 Component       Value           Ref Range       Performed At

 

   



                 Ammonia         64              18 - 72 mol/L     [facility]













                                         Specimen

 





                                         Blood









   



                 Performing Organization     Address         City/[facility]/Zipcode     Phone Number

 

   



                 [facility]     [address] [place] [address]     [phone]





                                         [facility]   





* Hepatic function panel (2018  5:13 AM CST)



Only the most recent of 3 results within the time period is included.





   



                 Component       Value           Ref Range       Performed At

 

   



                 Protein, Total     6.2             6.0 - 8.3 gm/dL     [facility]

 

   



                 Albumin         2.8 (L)         3.5 - 5.0 g/dL     [facility]

 

   



                 Total Bilirubin     3.3 (H)         0.2 - 1.2 mg/dL     [facility]

 

   



                 Bilirubin, Direct     1.0 (H)         0.1 - 0.5 mg/dL     [facility]

 

   



                 Alkaline Phosphatase     139             40 - 150 U/L     [facility]

 

   



                 AST             84 (H)          5 - 34 U/L      [facility]

 

   



                 ALT             50              6 - 55 U/L      [facility]













                                         Specimen

 





                                         Blood









 



                           Narrative                 Performed At

 

 



                           Specimen moderately icteric     [facility]









   



                 Performing Organization     Address         City/State/Zipcode     Phone Number

 

   



                 [facility]     [address] [place] [address] [phone]





                                         MED[facility]   





* Basic metabolic panel (2018  5:13 AM CST)



Only the most recent of 3 results within the time period is included.





   



                 Component       Value           Ref Range       Performed At

 

   



                 Sodium          137             136 - 145 meq/L     [facility]

 

   



                 Potassium       3.8             3.5 - 5.1 meq/L     [facility]

 

   



                 Chloride        108 (H)         98 - 107 meq/L     [facility]

 

   



                 CO2             20 (L)          22 - 29 meq/L     [facility]

 

   



                 BUN             20              7 - 21 mg/dL     [facility]

 

   



                 Creatinine      1.31 (H)        0.57 - 1.25 mg/dL     [facility]

 

   



                 Glucose         101             70 - 105 mg/dL     [facility]

 

   



                 Calcium         8.5             8.4 - 10.2 mg/dL     [facility]

 

   



                 EGFR            39Comment: ESTIMATED GFR IS     mL/min/1.73 sq m     



                           NOT AS ACCURATE AS CREATININE      [facility]



                                         CLEARANCE IN PREDICTING  



                                         GLOMERULAR FILTRATION RATE.  



                                         ESTIMATED GFR IS NOT  



                                         APPLICABLE FOR DIALYSIS  



                                         PATIENTS.  













                                         Specimen

 





                                         Blood









 



                           Narrative                 Performed At

 

 



                           Specimen moderately icteric     [facility]









   



                 Performing Organization     Address         City/State/Zipcode     Phone Number

 

   



                 [facility]     [address] [place] [address] [phone]





                                         MEDICAL CENTER   





* CBC with platelet count + automated diff (2018  5:12 AM CST)



Only the most recent of 3 results within the time period is included.





   



                 Component       Value           Ref Range       Performed At

 

   



                 WBC             5.9             3.5 - 10.5 K/L     [facility]

 

   



                 RBC             3.62 (L)        3.93 - 5.22 M/L     [facility]

 

   



                 Hemoglobin      11.7            11.2 - 15.7 GM/DL     [facility]

 

   



                 Hematocrit      35.7            34.1 - 44.9 %     [facility]

 

   



                 MCV             98.6 (H)        79.4 - 94.8 fL     [facility]

 

   



                 MCH             32.3 (H)        25.6 - 32.2 pg     [facility]

 

   



                 MCHC            32.8            32.2 - 35.5 GM/DL     [facility]

 

   



                 RDW             15.2 (H)        11.7 - 14.4 %     [facility]

 

   



                 Platelets       87 (L)          150 - 450 K/CU MM     [facility]

 

   



                 MPV             11.4            9.4 - 12.3 fL     [facility]

 

   



                 nRBC            0               0 - 0 /100 WBC     [facility]

 

   



                 % Neutros       63              %               [facility]

 

   



                 % Lymphs        19              %               [facility]

 

   



                 % Monos         16              %               [facility]

 

   



                 % Eos           2               %               [facility]

 

   



                 % Baso          1               %               [facility]

 

   



                 # Neutros       3.69            1.56 - 6.13 K/L     [facility]

 

   



                 # Lymphs        1.09 (L)        1.18 - 3.74 K/L     [facility]

 

   



                 # Monos         0.96 (H)        0.24 - 0.36 K/L     [facility]

 

   



                 # Eos           0.09            0.04 - 0.36 K/L     [facility]

 

   



                 # Baso          0.04            0.01 - 0.08 K/L     [facility]

 

   



                 Immature        0               0 - 1 %         



                           [facility]













                                         Specimen

 





                                         Blood









   



                 Performing Organization     Address         City/[facility]     Phone Number

 

   



                 [address] [address]     [phone]





                                         [facility]   





* POC-Lactic Acid, Venous (2018  7:16 AM CST)





   



                 Component       Value           Ref Range       Performed At

 

   



                 POC-Lactic Acid, Venous     2.8 (H)Comment: TESTED AT     0.9 - 1.7 mmol/L     [address]



                                         [id number]  













                                         Specimen

 





                                         Blood









   



                 Performing Organization     Address         City/[facility]     Phone Number

 

   



                 [place] [id number] [place] [address]   





* US abdomen complete (2018  4:10 AM CST)





 



                           Narrative                 Performed At

 

 



                           FINAL REPORT              [facility]



                                         PATIENT ID: [id number] 



                                         Ultrasound of the Abdomen, complete 



                                         Clinical History:Emesis and hepatic encephalopathy. 



                                         Discussion: 



                                         Sonographic evaluation of the abdomen was performed. There is no 



                                         prior study for comparison. 



                                         Liver: 16.4 cm in length at the right midclavicular line.Coarse 



                                         echotexture with nodular contour keeping with cirrhosis.1.0 x 0.9 x 



                                         0.8 cm simple cyst in the left hepatic lobe.Main portal vein is 



                                         patent measuring 1.0 cm in diameter and demonstrates hepatopetal flow. 



                                         Biliary tree:Common duct 10 mm.No intrahepatic biliary ductal 



                                         dilatation. 



                                         Gallbladder: Status post cholecystectomy. 



                                         Pancreas: Partially obscured by bowel gas but the visualized portions 



                                         are unremarkable. 



                                         Ascites:None seen 



                                         Spleen:Normal size measuring 12 x 5 x 4.9 cm. 



                                         Kidneys: Right kidney 10.7 x 4.5 x 4.6 cm with cortical thickness 



                                         of 1.2 cm.Left kidney 11.5 x 5.8 x 5.2 cm with cortical thickness 



                                         of 1.6 cm.Normal cortical echogenicity. 2.3 x 1.8 x 2.3 cm simple 



                                         right lower pole cyst. No shadowing calculus or hydronephrosis. 



                                         IVC/Aorta:Segments partially seen.Unremarkable. 



                                         Impression: 



                                         Status post cholecystectomy. No biliary ductal dilatation. 



                                         Cirrhotic morphology of the liver. Small left hepatic cyst. 



                                         Patent main portal vein. 



                                         Signed: Stephanie Presley MD 



                                         Report Verified Date/Time:2018 04:55:29 



                                         Reading Location: [address] Body Reading Room 



                                         Electronically signed by: STEPHANIE PRESLEY MD on 2018 04:55 





                                         AM 









                                        Procedure Note

 

                                        



Interface, External Ris In - 2018  4:57 AM CST



FINAL REPORT

 

PATIENT ID:   [id number]

 

 

Ultrasound of the Abdomen, complete

 

Clinical History:  Emesis and hepatic encephalopathy.

 

Discussion:

 

Sonographic evaluation of the abdomen was performed. There is no 

prior study for comparison.

 

Liver:   16.4 cm in length at the right midclavicular line.  Coarse 

echotexture with nodular contour keeping with cirrhosis.  1.0 x 0.9 x 

                                        0.8 cm simple cyst in the left hepatic lobe.  Main portal vein is 

patent measuring 1.0 cm in diameter and demonstrates hepatopetal flow.

 

Biliary tree:  Common duct 10 mm.  No intrahepatic biliary ductal 

dilatation.

 

Gallbladder: Status post cholecystectomy.

 

Pancreas: Partially obscured by bowel gas but the visualized portions 

are unremarkable.

 

Ascites:  None seen

 

Spleen:  Normal size measuring 12 x 5 x 4.9 cm.

 

Kidneys:   Right kidney 10.7 x 4.5 x 4.6 cm with cortical thickness 

of 1.2 cm.  Left kidney 11.5 x 5.8 x 5.2 cm with cortical thickness 

of 1.6 cm.  Normal cortical echogenicity. 2.3 x 1.8 x 2.3 cm simple 

right lower pole cyst. No shadowing calculus or hydronephrosis.

 

IVC/Aorta:  Segments partially seen.  Unremarkable.

 

Impression:

 

Status post cholecystectomy. No biliary ductal dilatation.

Cirrhotic morphology of the liver. Small left hepatic cyst.

Patent main portal vein.

 

Signed: Stephanie Presley MD

Report Verified Date/Time:  2018 04:55:29

 

Reading Location: [facility] C013Y CT Body Reading Room

    Electronically signed by: STEPHANIE PRESLEY MD on 2018 04:55 AM

 









   



                 Performing Organization     Address         City/[facility]     Phone Number

 

   



                                         [facility]   





* XR chest 1 view portable / bedside (2018  3:27 AM CST)





 



                           Narrative                 Performed At

 

 



                           FINAL REPORT              [facility]



                                         PATIENT ID: [id number] 



                                         Chest one view. 



                                         Clinical history: Emesis and hepatic encephalopathy. 



                                         Comparison: None. 



                                         Technique: A single frontal view of the chest was obtained. 



                                         Findings: 



                                         The patient is status post median sternotomy. 



                                         The heart is normal in size. The aorta is tortuous. There is no focal 



                                         pulmonary consolidation, pleural effusion or pneumothorax. There is 



                                         no pulmonary edema. 



                                         There is lower cervical spine fusion hardware. 



                                         Impression: 



                                         No focal pulmonary consolidation. 



                                          



                                         Signed: Stephanie Presley MD 



                                         Report Verified Date/Time:2018 03:28:02 



                                         Reading Location: [facility] C013Y CT Body Reading Room 



                                         Electronically signed by: STEPHANIE PRESLEY MD on 2018 03:28 





                                         AM 









                                        Procedure Note

 

                                        



Interface, External Ris In - 2018  3:30 AM CST



FINAL REPORT

 

PATIENT ID:   [id number]

 

 

Chest one view.

 

Clinical history: Emesis and hepatic encephalopathy.

 

Comparison: None.

 

Technique: A single frontal view of the chest was obtained. 

 

Findings:

The patient is status post median sternotomy.

The heart is normal in size. The aorta is tortuous. There is no focal 

pulmonary consolidation, pleural effusion or pneumothorax. There is 

no pulmonary edema.

 

There is lower cervical spine fusion hardware.

 

 Impression:

 

No focal pulmonary consolidation.

 

 

 

   

 

Signed: Stephanie Presley MD

Report Verified Date/Time:  2018 03:28:02

 

Reading Location: [facility] C013Y CT Body Reading Room

    Electronically signed by: STEPHANIE PRESLEY MD on 2018 03:28 AM

 









   



                 Performing Organization     Address         City/[facility]     Phone Number

 

   



                                         [facility]   





* CT brain without IV contrast (2018  2:32 AM CST)





 



                           Narrative                 Performed At

 

 



                           FINAL REPORT              [facility]



                                         PATIENT ID: [id number] 



                                         CLINICAL HISTORY: Confusion, loss of consciousness, emesis, possible 



                                         seizure 



                                         COMPARISON: None 



                                         Multiple axial images of the brain were performed without IV contrast. 



                                         This exam was performed according to our departmental 



                                         dose-optimization program, which includes automated exposure control, 



                                         adjustment of the mA and/or kV according to patient size and/or use 



                                         of the iterative reconstruction technique. 



                                         Intracranial hemorrhage: None. 



                                         Brain parenchyma:Diffuse parenchymal volume loss. Scattered 



                                         subcortical and periventricular non-specific white matter 



                                         hypodensities suggestive of microangiopathy. 



                                         Ventricles, sulci and basal cisterns: Normal for age. 



                                         Extra-axial spaces: Normal. 



                                         Midline shift: None. 



                                         Visualized vasculature: Atherosclerotic calcifications. 



                                         Cranium: No significant findings. 



                                         Skullbase: No significant findings. 



                                         Paranasal sinuses: No significant findings. 



                                         IMPRESSION: 



                                         No definite acute intracranial abnormality. There is no mass lesion, 



                                         intracranial hemorrhage or CT evidence of acute stroke. 



                                         Microvascular and involutional changes. 



                                         Please note that CT is insensitive in the detection of acute ischemia. 



                                         Signed: Moo Leong MD 



                                         Report Verified Date/Time:2018 02:34:19 



                                         Reading Location: [address] Reading Room 



                                         Electronically signed by: MOO LEONG M.D. on 2018 02:34 





                                         AM 









                                        Procedure Note

 

                                        



Interface, External Ris In - 2018  2:36 AM CST



FINAL REPORT

 

PATIENT ID:   [id number]

 

 

CLINICAL HISTORY: Confusion, loss of consciousness, emesis, possible 

seizure

 

COMPARISON: None

 

Multiple axial images of the brain were performed without IV contrast.

 

This exam was performed according to our departmental 

dose-optimization program, which includes automated exposure control, 

adjustment of the mA and/or kV according to patient size and/or use 

of the iterative reconstruction technique. 

 

Intracranial hemorrhage: None.

 

Brain parenchyma:    Diffuse parenchymal volume loss. Scattered 

subcortical and periventricular non-specific white matter 

hypodensities suggestive of microangiopathy.

 

Ventricles, sulci and basal cisterns: Normal for age.

 

Extra-axial spaces: Normal.

 

Midline shift: None.

 

Visualized vasculature: Atherosclerotic calcifications.

 

Cranium: No significant findings.

 

Skullbase: No significant findings.

 

Paranasal sinuses: No significant findings.

 

 

IMPRESSION:

 

No definite acute intracranial abnormality. There is no mass lesion, 

intracranial hemorrhage or CT evidence of acute stroke.

 

Microvascular and involutional changes.

 

Please note that CT is insensitive in the detection of acute ischemia.

 

Signed: Moo Leong MD

Report Verified Date/Time:  2018 02:34:19

 

Reading Location: [address] Reading Room

      Electronically signed by: MOO LEONG M.D. on 2018 02:34 AM

 









   



                 Performing Organization     Address         City/State/Zipcode     Phone Number

 

   



                                         [facility]   





* PTT (aPTT) (2018 12:42 AM CST)





   



                 Component       Value           Ref Range       Performed At

 

   



                 PTT             38.9 (H)        22.5 - 36.0 seconds     [facility]













                                         Specimen

 





                                         Blood









   



                 Performing Organization     Address         City[facility]     Phone Number

 

   



                 [facility]     [address] [place] [id number]     [phone]   





* PT/INR (2018 12:42 AM CST)





   



                 Component       Value           Ref Range       Performed At

 

   



                 Protime         17.0 (H)        11.7 - 14.7 seconds     [facility]

 

   



                 INR             1.4             <=5.9           [facility]













                                         Specimen

 





                                         Blood









 



                           Narrative                 Performed At

 

 



                           RECOMMENDED COUMADIN/WARFARIN INR THERAPY RANGES     



                           STANDARD DOSE: 2.0 - 3.0 Includes: PROPHYLAXIS for venous thrombosis,     [facility]



                                         systemic embolization; TREATMENT for venous thrombosis and/or pulmonary embolus.

 



                                         HIGH RISK: Target INR is 2.5-3.5 for patients with mechanical heart valves. 









   



                 Performing Organization     Address         City/[facility]     Phone Number

 

   



                 [address]   





* Lactic acid, venous, whole blood (2018 12:06 AM CST)





   



                 Component       Value           Ref Range       Performed At

 

   



                 Lactate, Venous     2.7 (H)Comment: Specimen     0.5 - 2.2 mmol/L     





                           slightly hemolyzed        [facility]













                                         Specimen

 





                                         Blood









 



                           Narrative                 Performed At

 

 



                           Specimen slightly icteric     [facility]









   



                 Performing Organization     Address         City/[facility] Cent[facility]ode     Phone Number

 

   



                 [facility]     [address] [place] [address] [phone] [place] [address]   





* Phosphorus (2018 12:06 AM CST)





   



                 Component       Value           Ref Range       Performed At

 

   



                 Phosphorus      2.4             2.3 - 4.7 mg/dL     [facility]













                                         Specimen

 





                                         Blood









   



                 Performing Organization     Address         City/[facility]/Zipcode     Phone Number

 

   



                 [facility]     [address] [place] [id number] [phone]





                                         [facility]   





* Magnesium (2018 12:06 AM CST)





   



                 Component       Value           Ref Range       Performed At

 

   



                 Magnesium       2.3             1.6 - 2.6 mg/dL     [facility]













                                         Specimen

 





                                         Blood









   



                 Performing Organization     Address         City/State/Zipcode     Phone Number

 

   



                 [address] [address] [phone]





                                         MEDICAL CENTER   





* Blood gas, venous (2018 12:06 AM CST)





   



                 Component       Value           Ref Range       Performed At

 

   



                 pH, Sebastian         7.40            7.32 - 7.42     [facility]

 

   



                 pCO2, Sebastian       44              41 - 51 mmHg     [facility]

 

   



                 pO2, Sebastian        94 (H)          25 - 40 mmHg     [facility]

 

   



                 O2 Sat, Sebastian     97.2 (H)        40.0 - 70.0 %     [facility]

 

   



                 HCO3, Sebastian       27              21 - 29 mmol/L     [facility]

 

   



                 Base Excess, Sebastian     1.6             -2.0 - 3.0 mmol/L     [facility]

 

   



                 Patient Temperature     37.0            C               [facility]

 

   



                 FIO2            21.0            %               [facility]













                                         Specimen

 





                                         Blood









   



                 Performing Organization     Address         City/State/Zipcode     Phone Number

 

   



                 [address] [address] [phone]





                                         MEDICAL CENTER   





* Urinalysis w/Microscopic + Reflex to Culture (2018 12:01 AM CST)





   



                 Component       Value           Ref Range       Performed At

 

   



                     Color, UA           Yellow              [facility]

 

   



                     Clarity, UA         Hazy                [facility]

 

   



                 Specific Colliers, UA     1.015           1.001 - 1.035     [facility]

 

   



                 pH, UA          6.5             5.0 - 8.0       [facility]

 

   



                 Protein, UA     70 mg/dL (A)     Negative        [facility]

 

   



                 Glucose, UA     Negative        Negative        [facility]

 

   



                 Ketones, UA     Negative        Negative        [facility]

 

   



                 Bilirubin, UA     Negative        Negative        [facility]

 

   



                 Blood, UA       Moderate (A)     Negative        [facility]

 

   



                 Nitrite, UA     Negative        Negative        [facility]

 

   



                 Leukocytes, UA     Large (A)       Negative        [facility]

 

   



                 Urobilinogen, UA     12.0 (H)        0.2 - 1.0 mg/dL     [facility]

 

   



                 RBC, UA         7               /HPF            [facility]

 

   



                 WBC, UA         149             /HPF            [facility]

 

   



                     Bacteria, UA        Many                [facility]

 

   



                 Squam Epithel, UA     8               /HPF            [facility]

 

   



                 Hyaline Casts, UA     10              /LPF            [facility]

 

   



                 Granular Casts, UA     20              /LPF            [facility]

 

   



                           Specimen Source           [facility]













                                         Specimen

 





                                         Urine - Urine, Clean



                                         Catch









   



                 Performing Organization     Address         City/State/Zipcode     Phone Number

 

   



                 [facility]     [address] [place] [address] [phone]





                                         MEDICAL CENTER   





* Urine culture (2018 12:01 AM CST)





   



                 Component       Value           Ref Range       Performed At

 

   



                     Result              RAOULTELLA ORNITHINOLYTICA (A)      [facility]

 

   



                     Result              ENTEROBACTER CLOACAE COMPLEX      



                           ()                       [facility]













                                         Specimen

 





                                         Urine - Urine, Clean



                                         Catch









                    Antibiotic          Method              Susceptibility



                                         Organism   

 

                    Amikacin                                



<=2: Susceptible



                                         Raoultella   



                                         ornithinolytica   

 

                    Ampicillin + Sulbactam                        



8: Susceptible



                                         Raoultella   



                                         ornithinolytica   

 

                    Aztreonam                               



<=1: Susceptible



                                         Raoultella   



                                         ornithinolytica   

 

                    Cefepime                                



<=1: Susceptible



                                         Raoultella   



                                         ornithinolytica   

 

                    Cefoxitin                               



<=4: Susceptible



                                         Raoultella   



                                         ornithinolytica   

 

                    Ceftazidime                             



<=1: Susceptible



                                         Raoultella   



                                         ornithinolytica   

 

                    Ceftriaxone                             



<=1: Susceptible



                                         Raoultella   



                                         ornithinolytica   

 

                    Ertapenem                               



<=0.5: Susceptible



                                         Raoultella   



                                         ornithinolytica   

 

                    Gentamicin                              



<=1: Susceptible



                                         Raoultella   



                                         ornithinolytica   

 

                    Levofloxacin                            



<=0.12: Susceptible



                                         Raoultella   



                                         ornithinolytica   

 

                    Meropenem                               



<=0.25: Susceptible



                                         Raoultella   



                                         ornithinolytica   

 

                    Nitrofurantoin                          



<=16: Susceptible



                                         Raoultella   



                                         ornithinolytica   

 

                    Piperacillin + Tazobactam                        



<=4: Susceptible



                                         Raoultella   



                                         ornithinolytica   

 

                    Tetracycline                            



<=1: Susceptible



                                         Raoultella   



                                         ornithinolytica   

 

                    Tobramycin                              



<=1: Susceptible



                                         Raoultella   



                                         ornithinolytica   

 

                    Trimethoprim + Sulfamethoxazole                        



<=20: Susceptible



                                         Raoultella   



                                         ornithinolytica   

 

                    Amikacin                                



<=8: Susceptible



                                         Enterobacter cloacae   



                                         complex   

 

                    Ampicillin                              



>16: Resistant



                                         Enterobacter cloacae   



                                         complex   

 

                    Ampicillin + Sulbactam                        



16: Resistant



                                         Enterobacter cloacae   



                                         complex   

 

                    Aztreonam                               



<=1: Susceptible



                                         Enterobacter cloacae   



                                         complex   

 

                    Cefepime                                



<=4: Dose Dependent Susceptible



                                         Enterobacter cloacae   



                                         complex   

 

                    Ceftazidime                             



<=1: Susceptible



                                         Enterobacter cloacae   



                                         complex   

 

                    Ciprofloxacin                           



<=0.5: Susceptible



                                         Enterobacter cloacae   



                                         complex   

 

                    Gentamicin                              



<=2: Susceptible



                                         Enterobacter cloacae   



                                         complex   

 

                    Levofloxacin                            



<=1: Susceptible



                                         Enterobacter cloacae   



                                         complex   

 

                    Meropenem                               



<=0.5: Susceptible



                                         Enterobacter cloacae   



                                         complex   

 

                    Nitrofurantoin                          



>8: Susceptible



                                         Enterobacter cloacae   



                                         complex   

 

                    Piperacillin + Tazobactam                        



<=8: Susceptible



                                         Enterobacter cloacae   



                                         complex   

 

                    Tetracycline                            



>8: Resistant



                                         Enterobacter cloacae   



                                         complex   

 

                    Tobramycin                              



<=2: Susceptible



                                         Enterobacter cloacae   



                                         complex   

 

                    Trimethoprim + Sulfamethoxazole                        



>80: Resistant



                                         Enterobacter cloacae   



                                         complex   

 

 



                                         Comment: Higher doses of



                                         Cefepime such as 1g every



                                         8 hours are recommended



                                         with an interpretation of



                                         Dose Dependent



                                         Susceptible









   



                 Performing Organization     Address         City/State/Zipcode     Phone Number

 

   



                 [facility]     [address] [place] [address] [phone]





                                         MEDICAL CENTER   





* ECG/EKG Interpretation (2018 11:30 PM CST)





 



                           Narrative                 Performed At

 

 



                                         Gracie Carrillo MD 20188:51 AM 



                                         ECG/EKG Interpretation 



                                         Date/Time: 2018 11:21 PM 



                                         Performed by: Gracie Carrillo MD 



                                         Authorized by: Gracie Carrillo MD 



                                         The ECG was interpreted by ED physician. This ECG was not compared with 



                                         previous ECG(s).There was no previous ECG available for comparison. The 



                                         ECG is interpreted as sinus bradycardia. Rate is bradycardic. Heart rate 



                                         is 52 BPM. 



                                         Abnormal conduction noted: pr 0.212 and 1st degree. 



                                         ST segments normal. T waves abnormal. T-wave inversion in lead(s) III and 



                                         V3. T-wave flattening in lead(s) V4, V5 and V6. Axis is normal. 



                                         Other findings include: QTc 0.470and prolonged QTc interval. Clinical 



                                         Impression: abnormal ECGECG reviewed and does not meet STEMI criteria. 



                                         Patient tolerance: Patient tolerated the procedure well with no immediate 



                                         complications 





* ECG 12 lead (2018 11:21 PM CST)





 



                           Narrative                 Performed At

 

 



                           Ventricular Rate 52 BPM     [facility]



                                         Atrial Rate 52 BPM 



                                         P-R Interval 212 ms 



                                         QRS Duration 90 ms 



                                         Q-T Interval 506 ms 



                                         QTC Calculation(Bazett) 470 ms 



                                         P Axis 72 degrees 



                                         R Axis 56 degrees 



                                         T Axis 1 degrees 



                                         Sinus bradycardia with 1st degree A-V block 



                                         Nonspecific ST and T wave abnormality 



                                         Prolonged QT 



                                         Abnormal ECG 



                                         No previous ECGs available 



                                         Confirmed by ELIF WILLAMS MICHAEL ([id number]) on 2018 7:21:49 AM 









                                        Procedure Note

 

                                        



Interface, External Ris In - 2018  7:21 AM CST



Ventricular Rate 52 BPM

Atrial Rate 52 BPM

P-R Interval 212 ms

QRS Duration 90 ms

Q-T Interval 506 ms

QTC Calculation(Bazett) 470 ms

P Axis 72 degrees

R Axis 56 degrees

T Axis 1 degrees



Sinus bradycardia with 1st degree A-V block

Nonspecific ST and T wave abnormality

Prolonged QT

Abnormal ECG

No previous ECGs available

Confirmed by ELIF WILLAMS MICHAEL ([id number]) on 2018 7:21:49 AM









   



                 Performing Organization     Address         City/State/Zipcode     Phone Number

 

   



                                         [facility]   





after 2017



Insurance







     



            Payer      Benefit     Subscriber ID     Type       Phone      Address



                                         Plan /    



                                         Group    

 

     



                     [facility]          xxxxxxxxxxx   



                                         MEDICARE    



                                         ADV    









     



            Guarantor Name     Account     Relation to     Date of     Phone      Billing Address



                     Type                Patient             Birth  

 

     



            Merly Phoenix     Personal/F     Self       1940     [phone] [address]



                     [facility]               (Home)              [place] [address]







Advance Directives





For more information, please contact:



[facility]



[address]



[place] [address] [phone]









                          Date Inactivated          Comments



                           Code Status               Date Activated  

 

                                                     



                           Full Code                 2018  3:05 AM  









  



                           This code status was determined by:     Patient

## 2018-12-30 NOTE — XMS REPORT
Patient Summary Document

                             Created on: 2018



ARY NGO

External Reference #: [id number]

: 1940

Sex: Female



Demographics







                          Address                   [address] [place] [address]  [id number]

 

                          Preferred Language        Unknown

 

                          Marital Status            Unknown

 

                          Advent Affiliation     Unknown

 

                          Race                      Unknown

 

                                        Additional Race(s)  

 

                          Ethnic Group              Unknown





Author







                          Author                    [facility]

 

                          Address                   Unknown

 

                          Phone                     Unavailable







Care Team Providers







                    Care Team Member Name    Role                Phone

 

                    DIPESH LUCIO        Unavailable         Unavailable

 

                    ELIZABETHANANYAJACKELINE      Unavailable         Unavailable







Problems

This patient has no known problems.



Allergies, Adverse Reactions, Alerts

This patient has no known allergies or adverse reactions.



Medications

This patient has no known medications.



Results







           Test Description    Test Time    Test Comments    Text Results    Atomic Results    Result

 Comments

 

                MR, ABDOMEN, WITH    2018 18:05:00                    FINAL REPORT PATIENT ID:   [id number] MRI

 of the abdomen dated 2018 Comment: Multiplanar T1 and T2-weighted 
images of the abdomen, postcontrast axial and coronal T1-weighted images of the 
abdomen were obtained. Liver is cirrhotic liver is cirrhotic in appearance with 
irregular margins. No suspicious mass or abnormal enhancement is seen in the 
liver. A 1 cm cyst is seen in the segment to 3 of the liver. Spleen is normal in
size. The splenic, spleen mesenteric, portal, and hepatic veins are patent. Main
portal vein measures approximately 9 mm in diameter. No portal vein thrombosis 
is seen. Gallbladder is not visualized. There is mild biliary dilatation. Common
bile duct measures 9 mm in diameter. Pancreas and adrenals are unremarkable. 
Both kidneys are normal in size and functioning. A 2.2 cm cyst is seen in the 
midpole right kidney. There is trace amount of ascites is seen in the abdomen. 
IMPRESSION:1. Cirrhosis.2. No suspicious hepatic mass.3. Liver and right renal 
cysts.4. Trace ascites. Signed: Cuco Ballesteros MDReport Verified Date/Time:  
2018 18:05:21 Reading Location: [facility] C013Y CT Body Reading Room      
Electronically signed by: CUCO BALLESTEROS M.D. on 2018 06:05 PM      

 

                POCT-CREATININE    2018 14:33:00                      

 

   

 

                POC-CREATININE (MARIJAAKER) (test code=1859)    0.9 mg/dL       0.6-1.3         TESTED AT [address] [place] [address]

 

                POC-EGFR (BEAKER) (test code=1860)    61 mL/min/1.73M2                     





BASIC METABOLIC [id number] 08:17:00* 





                Test Item       Value           Reference Range    Comments

 

                SODIUM (BEAKER) (test code=381)    137 meq/L       136-145          

 

                POTASSIUM (BEAKER) (test code=379)    3.8 meq/L       3.5-5.1          

 

                CHLORIDE (BEAKER) (test code=382)    108 meq/L                  

 

                CO2 (BEAKER) (test code=355)    20 meq/L        22-29            

 

                BLOOD UREA NITROGEN (BEAKER) (test code=354)    20 mg/dL        7-21             

 

                CREATININE (BEAKER) (test code=358)    1.31 mg/dL      0.57-1.25        

 

                GLUCOSE RANDOM (BEAKER) (test code=652)    101 mg/dL                  

 

                CALCIUM (BEAKER) (test code=697)    8.5 mg/dL       8.4-10.2         

 

                EGFR (BEAKER) (test code=1092)    39 mL/min/1.73 sq m                    ESTIMATED GFR IS NOT AS 

ACCURATE AS CREATININE CLEARANCE IN PREDICTING GLOMERULAR FILTRATION RATE. 
ESTIMATED GFR IS NOT APPLICABLE FOR DIALYSIS PATIENTS.





Specimen moderately ictericHEPATIC FUNCTION [id number] 08:17:00* 





                Test Item       Value           Reference Range    Comments

 

                TOTAL PROTEIN (BEAKER) (test code=770)    6.2 gm/dL       6.0-8.3          

 

                ALBUMIN (BEAKER) (test code=1145)    2.8 g/dL        3.5-5.0          

 

                BILIRUBIN TOTAL (BEAKER) (test code=377)    3.3 mg/dL       0.2-1.2          

 

                BILIRUBIN DIRECT (BEAKER) (test code=706)    1.0 mg/dL       0.1-0.5          

 

                ALKALINE PHOSPHATASE (BEAKER) (test code=346)    139 U/L                    

 

                AST (SGOT) (BEAKER) (test code=353)    84 U/L          5-34             

 

                ALT (SGPT) (BEAKER) (test code=347)    50 U/L          6-55             





Specimen moderately [id number] 07:50:00* 





                Test Item       Value           Reference Range    Comments

 

                AMMONIA (BEAKER) (test code=348)    64  mol/L       18-72            





CBC W/PLT COUNT & AUTO [id number] 06:14:00* 





                Test Item       Value           Reference Range    Comments

 

                WHITE BLOOD CELL COUNT (BEAKER) (test code=775)    5.9 K/ L        3.5-10.5         

 

                RED BLOOD CELL COUNT (BEAKER) (test code=761)    3.62 M/ L       3.93-5.22        

 

                HEMOGLOBIN (BEAKER) (test code=410)    11.7 GM/DL      11.2-15.7        

 

                HEMATOCRIT (BEAKER) (test code=411)    35.7 %          34.1-44.9        

 

                MEAN CORPUSCULAR VOLUME (BEAKER) (test code=753)    98.6 fL         79.4-94.8        

 

                MEAN CORPUSCULAR HEMOGLOBIN (BEAKER) (test code=751)    32.3 pg         25.6-32.2        

 

                    MEAN CORPUSCULAR HEMOGLOBIN CONC (BEAKER) (test code=752)    32.8 GM/DL          32.2-35.5

                                         

 

                RED CELL DISTRIBUTION WIDTH (BEAKER) (test code=412)    15.2 %          11.7-14.4        

 

                PLATELET COUNT (BEAKER) (test code=756)    87 K/CU MM      150-450          

 

                MEAN PLATELET VOLUME (BEAKER) (test code=754)    11.4 fL         9.4-12.3         

 

                NUCLEATED RED BLOOD CELLS (BEAKER) (test code=413)    0 /100 WBC      0-0              

 

                NEUTROPHILS RELATIVE PERCENT (BEAKER) (test code=429)    63 %                             

 

                LYMPHOCYTES RELATIVE PERCENT (BEAKER) (test code=430)    19 %                             

 

                MONOCYTES RELATIVE PERCENT (BEAKER) (test code=431)    16 %                             

 

                EOSINOPHILS RELATIVE PERCENT (BEAKER) (test code=432)    2 %                              

 

                BASOPHILS RELATIVE PERCENT (BEAKER) (test code=437)    1 %                              

 

                NEUTROPHILS ABSOLUTE COUNT (BEAKER) (test code=670)    3.69 K/ L       1.56-6.13        

 

                LYMPHOCYTES ABSOLUTE COUNT (BEAKER) (test code=414)    1.09 K/ L       1.18-3.74        

 

                MONOCYTES ABSOLUTE COUNT (BEAKER) (test code=415)    0.96 K/ L       0.24-0.36        

 

                EOSINOPHILS ABSOLUTE COUNT (BEAKER) (test code=416)    0.09 K/ L       0.04-0.36        

 

                BASOPHILS ABSOLUTE COUNT (BEAKER) (test code=417)    0.04 K/ L       0.01-0.08        

 

                IMMATURE GRANULOCYTES-RELATIVE PERCENT (BEAKER) (test code=2801)    0 %             0-1              





POCT-LACTIC ACID, [id number] 07:21:00* 





                Test Item       Value           Reference Range    Comments

 

                POC-LACTIC ACID, VENOUS (BEAKER) (test code=2805)    2.8 mmol/L      0.9-1.7         TESTED AT

 [facility] [address] [facility] [address]





BASIC METABOLIC [id number] 06:08:00* 





                Test Item       Value           Reference Range    Comments

 

                SODIUM (BEAKER) (test code=381)    133 meq/L       136-145          

 

                POTASSIUM (BEAKER) (test code=379)    4.4 meq/L       3.5-5.1         Specimen slightly hemolyzed



 

                CHLORIDE (BEAKER) (test code=382)    102 meq/L                  

 

                CO2 (BEAKER) (test code=355)    22 meq/L        22-29            

 

                BLOOD UREA NITROGEN (BEAKER) (test code=354)    22 mg/dL        7-21             

 

                CREATININE (BEAKER) (test code=358)    1.44 mg/dL      0.57-1.25       Specimen slightly hemolyzed



 

                GLUCOSE RANDOM (BEAKER) (test code=652)    114 mg/dL                  

 

                CALCIUM (BEAKER) (test code=697)    8.8 mg/dL       8.4-10.2         

 

                EGFR (BEAKER) (test code=1092)    35 mL/min/1.73 sq m                    ESTIMATED GFR IS NOT AS 

ACCURATE AS CREATININE CLEARANCE IN PREDICTING GLOMERULAR FILTRATION RATE. 
ESTIMATED GFR IS NOT APPLICABLE FOR DIALYSIS PATIENTS.





Specimen slightly ictericHEPATIC FUNCTION [id number] 06:08:00* 





                Test Item       Value           Reference Range    Comments

 

                TOTAL PROTEIN (BEAKER) (test code=770)    6.3 gm/dL       6.0-8.3         Specimen slightly hemolyzed



 

                ALBUMIN (BEAKER) (test code=1145)    2.7 g/dL        3.5-5.0         Specimen slightly hemolyzed



 

                BILIRUBIN TOTAL (BEAKER) (test code=377)    2.9 mg/dL       0.2-1.2         Specimen slightly 

hemolyzed

 

                BILIRUBIN DIRECT (BEAKER) (test code=706)    1.1 mg/dL       0.1-0.5         Specimen slightly

 hemolyzed

 

                ALKALINE PHOSPHATASE (BEAKER) (test code=346)    157 U/L                    

 

                AST (SGOT) (BEAKER) (test code=353)    73 U/L          5-34            Specimen slightly hemolyzed

 

                ALT (SGPT) (BEAKER) (test code=347)    45 U/L          6-55            Specimen slightly hemolyzed





Specimen slightly ictericCBC W/PLT COUNT & AUTO [id number] 05:53:00
  * 





                Test Item       Value           Reference Range    Comments

 

                WHITE BLOOD CELL COUNT (BEAKER) (test code=775)    6.6 K/ L        3.5-10.5         

 

                RED BLOOD CELL COUNT (BEAKER) (test code=761)    3.56 M/ L       3.93-5.22        

 

                HEMOGLOBIN (BEAKER) (test code=410)    11.6 GM/DL      11.2-15.7        

 

                HEMATOCRIT (BEAKER) (test code=411)    34.8 %          34.1-44.9        

 

                MEAN CORPUSCULAR VOLUME (BEAKER) (test code=753)    97.8 fL         79.4-94.8        

 

                MEAN CORPUSCULAR HEMOGLOBIN (BEAKER) (test code=751)    32.6 pg         25.6-32.2        

 

                    MEAN CORPUSCULAR HEMOGLOBIN CONC (BEAKER) (test code=752)    33.3 GM/DL          32.2-35.5

                                         

 

                RED CELL DISTRIBUTION WIDTH (BEAKER) (test code=412)    14.8 %          11.7-14.4        

 

                PLATELET COUNT (BEAKER) (test code=756)    80 K/CU MM      150-450          

 

                MEAN PLATELET VOLUME (BEAKER) (test code=754)    11.5 fL         9.4-12.3         

 

                NUCLEATED RED BLOOD CELLS (BEAKER) (test code=413)    0 /100 WBC      0-0              

 

                NEUTROPHILS RELATIVE PERCENT (BEAKER) (test code=429)    57 %                             

 

                LYMPHOCYTES RELATIVE PERCENT (BEAKER) (test code=430)    20 %                             

 

                MONOCYTES RELATIVE PERCENT (BEAKER) (test code=431)    20 %                             

 

                EOSINOPHILS RELATIVE PERCENT (BEAKER) (test code=432)    2 %                              

 

                BASOPHILS RELATIVE PERCENT (BEAKER) (test code=437)    1 %                              

 

                NEUTROPHILS ABSOLUTE COUNT (BEAKER) (test code=670)    3.76 K/ L       1.56-6.13        

 

                LYMPHOCYTES ABSOLUTE COUNT (BEAKER) (test code=414)    1.34 K/ L       1.18-3.74        

 

                MONOCYTES ABSOLUTE COUNT (BEAKER) (test code=415)    1.28 K/ L       0.24-0.36        

 

                EOSINOPHILS ABSOLUTE COUNT (BEAKER) (test code=416)    0.14 K/ L       0.04-0.36        

 

                BASOPHILS ABSOLUTE COUNT (BEAKER) (test code=417)    0.03 K/ L       0.01-0.08        

 

                IMMATURE GRANULOCYTES-RELATIVE PERCENT (BEAKER) (test code=2801)    0 %             0-1              





U/S, ABDOMINAL, [id number] 04:55:00Reason for exam:->EMESISReason for 
exam:->hepatic encephalopathyFINAL REPORT PATIENT ID:   [id number]  Ultrasound of 
the Abdomen, complete Clinical History:  Emesis and hepatic encephalopathy. 
Discussion: Sonographic evaluation of the abdomen was performed. There is no 
prior study for comparison. Liver:   16.4 cm in length at the right 
midclavicular line.  Coarse echotexture with nodular contour keeping with 
cirrhosis.  1.0 x 0.9 x 0.8 cm simple cyst in the left hepatic lobe.  Main 
portal vein is patent measuring 1.0 cm in diameter and demonstrates hepatopetal 
flow. Biliary tree:  Common duct 10 mm.  No intrahepatic biliary ductal 
dilatation. Gallbladder: Status post cholecystectomy. Pancreas: Partially 
obscured by bowel gas but the visualized portions are unremarkable. Ascites:  
None seen Spleen:  Normal size measuring 12 x 5 x 4.9 cm. Kidneys:   Right 
kidney 10.7 x 4.5 x 4.6 cm with cortical thickness of 1.2 cm.  Left kidney 11.5 
x 5.8 x 5.2 cm with cortical thickness of 1.6 cm.  Normal cortical echogenicity.
2.3 x 1.8 x 2.3 cm simple right lower pole cyst. No shadowing calculus or hydro
nephrosis. IVC/Aorta:  Segments partially seen.  Unremarkable. Impression: Statu
s post cholecystectomy. No biliary ductal dilatation.Cirrhotic morphology of the
liver. Small left hepatic cyst.Patent main portal vein. Signed: Stephanie Presley Verified Date/Time:  2018 04:55:29 Reading Location: [facility] C013Y 
CT Body Reading Room    Electronically signed by: STEPHANIE PRESLEY MD on  04:55 AM RAD, CHEST, 1 VIEW, NON [id number] 03:28:00Reason for 
exam:->EMESISReason for exam:->hepatic encephalopathyShould this be performed at
the bedside?->YesFINAL REPORT PATIENT ID:   [id number]  Chest one view. Clinical 
history: Emesis and hepatic encephalopathy. Comparison: None. Technique: A 
single frontal view of the chest was obtained.  Findings:The patient is status 
post median sternotomy.The heart is normal in size. The aorta is tortuous. There
is no focal pulmonary consolidation, pleural effusion or pneumothorax. There is 
no pulmonary edema. There is lower cervical spine fusion hardware.  Impression: 
No focal pulmonary consolidation.       Signed: Stephanie Presley MDReport Verified 
Date/Time:  2018 03:28:02 Reading Location: [address] Body Reading 
Room    Electronically signed by: STEPHANIE PRESLEY MD on 2018 03:28 
AM CT, BRAIN, WITHOUT [id number] 02:34:00Reason for exam:->EMESISReason 
for exam:->confusion, hallucinations, possible seizureWhat is the patient's 
sedation requirement?->No SedationFINAL REPORT PATIENT ID:   [id number]  CLINICAL 
HISTORY: Confusion, loss of consciousness, emesis, possible seizure COMPARISON: 
None Multiple axial images of the brain were performed without IV contrast. This
exam was performed according to our departmental dose-optimization program, 
which includes automated exposure control, adjustment of the mA and/or kV 
according to patient size and/or use of the iterative reconstruction technique. 
Intracranial hemorrhage: None. Brain parenchyma:    Diffuse parenchymal volume 
loss. Scattered subcortical and periventricular non-specific white matter 
hypodensities suggestive of microangiopathy. Ventricles, sulci and basal 
cisterns: Normal for age. Extra-axial spaces: Normal. Midline shift: None. 
Visualized vasculature: Atherosclerotic calcifications. Cranium: No significant 
findings. Skullbase: No significant findings. Paranasal sinuses: No significant 
findings.  IMPRESSION: No definite acute intracranial abnormality. There is no 
mass lesion, intracranial hemorrhage or CT evidence of acute stroke. 
Microvascular and involutional changes. Please note that CT is insensitive in 
the detection of acute ischemia. Signed: Leo Leong MDReport Verified Da
te/Time:  2018 02:34:19 Reading Location: [address] Reading 
Room      Electronically signed by: LEO LEONG M.D. on 2018 02:34 AM 
LACTIC ACID, VENOUS, WHOLE [id number] 01:16:00* 





                Test Item       Value           Reference Range    Comments

 

                LACTATE BLOOD VENOUS (2) (BEAKER) (test code=2872)    2.7 mmol/L      0.5-2.2         Specimen

 slightly hemolyzed





Specimen slightly [id number] 01:10:00* 





                Test Item       Value           Reference Range    Comments

 

                AMMONIA (BEAKER) (test code=348)    180  mol/L      18-72           Specimen slightly hemolyzed







[id number] 00:56:00* 





                Test Item       Value           Reference Range    Comments

 

                PARTIAL THROMBOPLASTIN TIME (BEAKER) (test code=760)    38.9 seconds    22.5-36.0        







PROTHROMBIN TIME/[id number] 00:55:00* 





                Test Item       Value           Reference Range    Comments

 

                PROTIME (BEAKER) (test code=759)    17.0 seconds    11.7-14.7        

 

                INR (BEAKER) (test code=370)    1.4             <=5.9            





RECOMMENDED COUMADIN/WARFARIN INR THERAPY RANGESSTANDARD DOSE: 2.0 - 3.0   Inclu
nathan: PROPHYLAXIS for venous thrombosis, systemic embolization; TREATMENT for js
ous thrombosis and/or pulmonary embolus.HIGH RISK: Target INR is 2.5-3.5 for pat
ients with mechanical heart valves.URINALYSIS W/ REFLEX URINE [id number] 
00:53:00* 





                Test Item       Value           Reference Range    Comments

 

                COLOR (BEAKER) (test code=470)    Yellow                           

 

                CLARITY (BEAKER) (test code=469)    Hazy                             

 

                SPECIFIC GRAVITY UA (BEAKER) (test code=468)    1.015           1.001-1.035      

 

                PH UA (BEAKER) (test code=467)    6.5             5.0-8.0          

 

                PROTEIN UA (BEAKER) (test code=464)    70 mg/dL        Negative         

 

                GLUCOSE UA (BEAKER) (test code=365)    Negative        Negative         

 

                KETONES UA (BEAKER) (test code=371)    Negative        Negative         

 

                BILIRUBIN UA (BEAKER) (test code=462)    Negative        Negative         

 

                BLOOD UA (BEAKER) (test code=461)    Moderate        Negative         

 

                NITRITE UA (BEAKER) (test code=465)    Negative        Negative         

 

                LEUKOCYTE ESTERASE UA (BEAKER) (test code=466)    Large           Negative         

 

                UROBILINOGEN UA (BEAKER) (test code=463)    12.0 mg/dL      0.2-1.0          

 

                RBC UA (BEAKER) (test code=519)    7 /HPF                           

 

                WBC UA (BEAKER) (test code=520)    149 /HPF                         

 

                BACTERIA (BEAKER) (test olds=722)    Many                             

 

                SQUAMOUS EPITHELIAL (BEAKER) (test code=516)    8 /HPF                           

 

                HYALINE CASTS (BEAKER) (test code=514)    10 /LPF                          

 

                GRANULAR CASTS (BEAKER) (test code=515)    20 /LPF                          

 

                SOURCE(BEAKER) (test code=2795)                                     





[id number] 00:45:00* 





                Test Item       Value           Reference Range    Comments

 

                PHOSPHORUS (BEAKER) (test code=604)    2.4 mg/dL       2.3-4.7          





[id number] 00:45:00* 





                Test Item       Value           Reference Range    Comments

 

                MAGNESIUM (BEAKER) (test code=627)    2.3 mg/dL       1.6-2.6          





BASIC METABOLIC [id number] 00:45:00* 





                Test Item       Value           Reference Range    Comments

 

                SODIUM (BEAKER) (test code=381)    133 meq/L       136-145          

 

                POTASSIUM (BEAKER) (test code=379)    4.0 meq/L       3.5-5.1          

 

                CHLORIDE (BEAKER) (test code=382)    99 meq/L                   

 

                CO2 (BEAKER) (test code=355)    25 meq/L        22-29            

 

                BLOOD UREA NITROGEN (BEAKER) (test code=354)    24 mg/dL        7-21             

 

                CREATININE (BEAKER) (test code=358)    1.49 mg/dL      0.57-1.25        

 

                GLUCOSE RANDOM (BEAKER) (test code=652)    138 mg/dL                  

 

                CALCIUM (BEAKER) (test code=697)    8.9 mg/dL       8.4-10.2         

 

                EGFR (BEAKER) (test code=1092)    34 mL/min/1.73 sq m                    ESTIMATED GFR IS NOT AS 

ACCURATE AS CREATININE CLEARANCE IN PREDICTING GLOMERULAR FILTRATION RATE. 
ESTIMATED GFR IS NOT APPLICABLE FOR DIALYSIS PATIENTS.





Specimen moderately ictericHEPATIC FUNCTION [id number] 00:45:00* 





                Test Item       Value           Reference Range    Comments

 

                TOTAL PROTEIN (BEAKER) (test code=770)    7.2 gm/dL       6.0-8.3          

 

                ALBUMIN (BEAKER) (test code=1145)    3.2 g/dL        3.5-5.0          

 

                BILIRUBIN TOTAL (BEAKER) (test code=377)    3.4 mg/dL       0.2-1.2          

 

                BILIRUBIN DIRECT (BEAKER) (test code=706)    1.3 mg/dL       0.1-0.5          

 

                ALKALINE PHOSPHATASE (BEAKER) (test code=346)    179 U/L                    

 

                AST (SGOT) (BEAKER) (test code=353)    73 U/L          5-34             

 

                ALT (SGPT) (BEAKER) (test code=347)    48 U/L          6-55             





Specimen moderately ictericBLOOD GAS, [id number] 00:33:00* 





                Test Item       Value           Reference Range    Comments

 

                PH VENOUS (BEAKER) (test code=701)    7.40            7.32-7.42        

 

                PCO2 VENOUS (BEAKER) (test code=755)    44 mmHg         41-51            

 

                PO2 VENOUS (BEAKER) (test code=702)    94 mmHg         25-40            

 

                O2 SATURATION VENOUS (BEAKER) (test code=703)    97.2 %          40.0-70.0        

 

                HCO3 VENOUS (BEAKER) (test code=705)    27 mmol/L       21-29            

 

                BASE EXCESS VENOUS (BEAKER) (test code=704)    1.6 mmol/L      -2.0-3.0         

 

                PATIENT TEMPERATURE (BEAKER) (test code=1818)    37.0 C                           

 

                FIO2 (BEAKER) (test code=1819)    21.0 %                           





CBC W/PLT COUNT & AUTO [id number] 00:19:00* 





                Test Item       Value           Reference Range    Comments

 

                WHITE BLOOD CELL COUNT (BEAKER) (test code=775)    6.8 K/ L        3.5-10.5         

 

                RED BLOOD CELL COUNT (BEAKER) (test code=761)    3.81 M/ L       3.93-5.22        

 

                HEMOGLOBIN (BEAKER) (test code=410)    12.6 GM/DL      11.2-15.7        

 

                HEMATOCRIT (BEAKER) (test code=411)    36.5 %          34.1-44.9        

 

                MEAN CORPUSCULAR VOLUME (BEAKER) (test code=753)    95.8 fL         79.4-94.8        

 

                MEAN CORPUSCULAR HEMOGLOBIN (BEAKER) (test code=751)    33.1 pg         25.6-32.2        

 

                    MEAN CORPUSCULAR HEMOGLOBIN CONC (BEAKER) (test code=752)    34.5 GM/DL          32.2-35.5

                                         

 

                RED CELL DISTRIBUTION WIDTH (BEAKER) (test code=412)    14.8 %          11.7-14.4        

 

                PLATELET COUNT (BEAKER) (test code=756)    101 K/CU MM     150-450          

 

                MEAN PLATELET VOLUME (BEAKER) (test code=754)    11.9 fL         9.4-12.3         

 

                NUCLEATED RED BLOOD CELLS (BEAKER) (test code=413)    0 /100 WBC      0-0              

 

                NEUTROPHILS RELATIVE PERCENT (BEAKER) (test code=429)    59 %                             

 

                LYMPHOCYTES RELATIVE PERCENT (BEAKER) (test code=430)    23 %                             

 

                MONOCYTES RELATIVE PERCENT (BEAKER) (test code=431)    15 %                             

 

                EOSINOPHILS RELATIVE PERCENT (BEAKER) (test code=432)    2 %                              

 

                BASOPHILS RELATIVE PERCENT (BEAKER) (test code=437)    1 %                              

 

                NEUTROPHILS ABSOLUTE COUNT (BEAKER) (test code=670)    3.97 K/ L       1.56-6.13        

 

                LYMPHOCYTES ABSOLUTE COUNT (BEAKER) (test code=414)    1.53 K/ L       1.18-3.74        

 

                MONOCYTES ABSOLUTE COUNT (BEAKER) (test code=415)    1.03 K/ L       0.24-0.36        

 

                EOSINOPHILS ABSOLUTE COUNT (BEAKER) (test code=416)    0.16 K/ L       0.04-0.36        

 

                BASOPHILS ABSOLUTE COUNT (BEAKER) (test code=417)    0.05 K/ L       0.01-0.08        

 

                IMMATURE GRANULOCYTES-RELATIVE PERCENT (BEAKER) (test code=2801)    0 %             0-1

## 2018-12-30 NOTE — DIAGNOSTIC IMAGING REPORT
EXAM: CT ABDOMEN AND PELVIS without IV CONTRAST

INDICATION:  Generalized weakness

COMPARISON:  None 

TECHNIQUE: The abdomen and pelvis were scanned using a multidetector helical

scanner. Coronal and sagittal reformations were obtained. Dose modulation,

iterative reconstruction, and/or weight based adjustment of the mA/kV was

utilized to reduce the radiation dose to as low as reasonably achievable.

Routine protocol performed.

IV Contrast: None

Oral Contrast: None

CTDIvol has been reviewed. It is below the limits set by the Radiation Protocol

Committee (RPC).



FINDINGS:

LOWER THORAX: No consolidations



LIVER: Cirrhotic liver morphology.

BILIARY: Cholecystectomy. Common bile duct dilation likely secondary to

reservoir effect. 



SPLEEN: No masses

PANCREAS: No masses



ADRENALS: No nodules



RIGHT KIDNEY: No nephroureterolithiasis or hydronephrosis. Simple cyst arising

from the anterior lateral aspect of the interpolar region measuring 2.4 cm.



LEFT KIDNEY: No nephroureterolithiasis or hydronephrosis.



GI TRACT: No wall thickening or obstruction. Normal appendix. Incidental

duodenal diverticulum.



VESSELS:  Moderate atherosclerotic changes of the abdominal aorta without

aneurysm. Splenic varices.

PERITONEUM/RETROPERITONEUM: Trace ascites.

LYMPH NODES: No lymphadenopathy



REPRODUCTIVE ORGANS: Normal

BLADDER: Decompressed by Mark catheter.



SOFT TISSUES: Bilateral flank subcutaneous edema.

BONES: No suspicious bone lesions. Marked degenerative disc predominantly at

T12/L1.



IMPRESSION:

Cirrhosis with portal hypertension and trace ascites.



Signed by: Dr. Rosetta Samuel M.D. on 12/30/2018 10:14 PM

## 2018-12-31 VITALS — DIASTOLIC BLOOD PRESSURE: 46 MMHG | SYSTOLIC BLOOD PRESSURE: 102 MMHG

## 2018-12-31 VITALS — DIASTOLIC BLOOD PRESSURE: 45 MMHG | SYSTOLIC BLOOD PRESSURE: 120 MMHG

## 2018-12-31 VITALS — DIASTOLIC BLOOD PRESSURE: 45 MMHG | SYSTOLIC BLOOD PRESSURE: 105 MMHG

## 2018-12-31 VITALS — SYSTOLIC BLOOD PRESSURE: 108 MMHG | DIASTOLIC BLOOD PRESSURE: 49 MMHG

## 2018-12-31 VITALS — SYSTOLIC BLOOD PRESSURE: 122 MMHG | DIASTOLIC BLOOD PRESSURE: 43 MMHG

## 2018-12-31 VITALS — DIASTOLIC BLOOD PRESSURE: 90 MMHG | SYSTOLIC BLOOD PRESSURE: 120 MMHG

## 2018-12-31 VITALS — SYSTOLIC BLOOD PRESSURE: 120 MMHG | DIASTOLIC BLOOD PRESSURE: 90 MMHG

## 2018-12-31 VITALS — SYSTOLIC BLOOD PRESSURE: 105 MMHG | DIASTOLIC BLOOD PRESSURE: 45 MMHG

## 2018-12-31 LAB
ALBUMIN SERPL-MCNC: 2 G/DL (ref 3.5–5)
ALBUMIN/GLOB SERPL: 0.6 {RATIO} (ref 0.8–2)
ALP SERPL-CCNC: 185 IU/L (ref 40–150)
ALT SERPL-CCNC: 254 IU/L (ref 0–55)
ANION GAP SERPL CALC-SCNC: 15.5 MMOL/L (ref 8–16)
BUN SERPL-MCNC: 46 MG/DL (ref 7–26)
BUN/CREAT SERPL: 31 (ref 6–25)
CALCIUM SERPL-MCNC: 8.2 MG/DL (ref 8.4–10.2)
CHLORIDE SERPL-SCNC: 101 MMOL/L (ref 98–107)
CO2 SERPL-SCNC: 17 MMOL/L (ref 22–29)
EGFRCR SERPLBLD CKD-EPI 2021: 34 ML/MIN (ref 60–?)
GLOBULIN PLAS-MCNC: 3.4 G/DL (ref 2.3–3.5)
GLUCOSE SERPLBLD-MCNC: 97 MG/DL (ref 74–118)
POTASSIUM SERPL-SCNC: 4.5 MMOL/L (ref 3.5–5.1)
SODIUM SERPL-SCNC: 129 MMOL/L (ref 136–145)

## 2018-12-31 RX ADMIN — LACTULOSE SCH GM: 20 SOLUTION ORAL at 18:45

## 2018-12-31 RX ADMIN — Medication SCH TAB: at 18:45

## 2018-12-31 RX ADMIN — CEFTRIAXONE SCH MLS/HR: 100 INJECTION, POWDER, FOR SOLUTION INTRAVENOUS at 20:36

## 2018-12-31 RX ADMIN — BUDESONIDE AND FORMOTEROL FUMARATE DIHYDRATE SCH EA: 160; 4.5 AEROSOL RESPIRATORY (INHALATION) at 19:00

## 2018-12-31 NOTE — NUR
Report received from ER RN [place].Patient transferred from ER @ 0105 by Stretcher.Patient was 
weakness/little confused,alert/orientedx1-2. Denied pain and no SOB. No respiratory distress 
noted. Patient continued on 2liters oxygen via nasal canula,Spo2 maintained 99%. patient 
 in the room. Head to toe assessment completed. No skin breakdown noted. Small 
redness noted on right and left buttocks. Bed in lower position,locked. Patient and  
instructed to call for help as needed. Will continue to monitor.

## 2018-12-31 NOTE — NUR
CALLED DR. CHAU MACIEL  TO REPORT CURRENT CMP VALUES, PER HIS ORDERS, MADE HIM AWARE OF PATIENT'S 
BLOOD PRESSURE DIASTOLIC PRESSURE 120/45 HR 68 OXYGEN 98 ROOM AIR AND PATIENT'S 12 HOUR 
OUTPUT 250CC FROM [facility]. RECEIVED ORDERS TO ADMINISTER LACTATED RINGERS, @50CC/HR,

## 2018-12-31 NOTE — DIAGNOSTIC IMAGING REPORT
EXAM: CHEST SINGLE (PORTABLE), AP 1 view



INDICATION: Rule out pulmonary edema. 



COMPARISON: Chest radiograph 12/30/2018. 



FINDINGS:

LINES/TUBES: None



LUNGS: Lungs are moderately inflated. Mild patchy bibasilar atelectasis. No

evidence of lobar pneumonia or pulmonary edema.



PLEURA: No effusions or pneumothorax.



HEART AND MEDIASTINUM: Possible mild cardiomegaly. Surgical changes of coronary

artery bypass.



BONES AND SOFT TISSUES: Lower cervical spine fixation hardware. Median

sternotomy wires. No acute bony findings.



IMPRESSION:

No acute radiographic abnormality. No evidence of pulmonary edema.



Signed by: Dr. Roxanne Reilly MD on 12/31/2018 4:32 PM

## 2018-12-31 NOTE — NUR
to bedside to discuss plan of care with patient/family. CM/SW role and care 
transitions discussed. Anticipated discharge plan discussed along with duration of care. 
CM/SW discussed patients right to make decisions in care. CM/SW work hours given.



Patient lives: PATIENT LIVES IN [address] IN [place], [place] WITH 

Admit/Transfer: ED

POA/Emergency contact: - [facility] [employer]- [phone]

Current/Previous Home Health: NONE

PCP/Follow-up Care: DR. MARISOL LOPEZ

Current/Previous DME: [facility], [employer]; WHEELCHAIR

Other Services: NONE

Employment Status: EMPLOYED

Areas of Concerns: MOBILITY

Referral Needs: POSSIBLE SKILLED NURSING PENDING PT RECOMMENDATION

Education Needs: NOT AT THIS TIME

IMM/MOON given and signed (if applicable): NO

Goal for discharge: PATIENT TO DISCHARGE HOME INDEPENDENT WITH NO NEEDS



CM left business card at the bedside with contact information. Name and number was also 
written on the patients whiteboard. Patient verbalized understanding of discussion. CM will 
follow-up with ongoing discharge and transition of care needs.

## 2019-01-01 VITALS — DIASTOLIC BLOOD PRESSURE: 42 MMHG | SYSTOLIC BLOOD PRESSURE: 104 MMHG

## 2019-01-01 VITALS — SYSTOLIC BLOOD PRESSURE: 104 MMHG | DIASTOLIC BLOOD PRESSURE: 42 MMHG

## 2019-01-01 VITALS — DIASTOLIC BLOOD PRESSURE: 40 MMHG | SYSTOLIC BLOOD PRESSURE: 107 MMHG

## 2019-01-01 VITALS — DIASTOLIC BLOOD PRESSURE: 46 MMHG | SYSTOLIC BLOOD PRESSURE: 118 MMHG

## 2019-01-01 LAB
ALBUMIN SERPL-MCNC: 1.9 G/DL (ref 3.5–5)
ALP SERPL-CCNC: 172 IU/L (ref 40–150)
ALT SERPL-CCNC: 247 IU/L (ref 0–55)
ANION GAP SERPL CALC-SCNC: 13.7 MMOL/L (ref 8–16)
BASOPHILS # BLD AUTO: 0.1 10*3/UL (ref 0–0.1)
BASOPHILS NFR BLD AUTO: 0.5 % (ref 0–1)
BILIRUB CONJ SERPL-MCNC: 2.9 MG/DL (ref 0–0.5)
BUN SERPL-MCNC: 52 MG/DL (ref 7–26)
BUN/CREAT SERPL: 32 (ref 6–25)
CALCIUM SERPL-MCNC: 8 MG/DL (ref 8.4–10.2)
CHLORIDE SERPL-SCNC: 101 MMOL/L (ref 98–107)
CO2 SERPL-SCNC: 22 MMOL/L (ref 22–29)
CREAT UR-MCNC: 142.14 MG/DL (ref 47–110)
DEPRECATED INR PLAS: 2.12
DEPRECATED NEUTROPHILS # BLD AUTO: 7.3 10*3/UL (ref 2.1–6.9)
EGFRCR SERPLBLD CKD-EPI 2021: 30 ML/MIN (ref 60–?)
EOSINOPHIL # BLD AUTO: 0.1 10*3/UL (ref 0–0.4)
EOSINOPHIL NFR BLD AUTO: 0.4 % (ref 0–6)
ERYTHROCYTE [DISTWIDTH] IN CORD BLOOD: 16.5 % (ref 11.7–14.4)
GLUCOSE SERPLBLD-MCNC: 98 MG/DL (ref 74–118)
HCT VFR BLD AUTO: 30 % (ref 34.2–44.1)
HGB BLD-MCNC: 10.2 G/DL (ref 12–16)
LYMPHOCYTES # BLD: 2 10*3/UL (ref 1–3.2)
LYMPHOCYTES NFR BLD AUTO: 17.5 % (ref 18–39.1)
MCH RBC QN AUTO: 32.2 PG (ref 28–32)
MCHC RBC AUTO-ENTMCNC: 34 G/DL (ref 31–35)
MCV RBC AUTO: 94.6 FL (ref 81–99)
MONOCYTES # BLD AUTO: 1.9 10*3/UL (ref 0.2–0.8)
MONOCYTES NFR BLD AUTO: 16.8 % (ref 4.4–11.3)
NEUTS SEG NFR BLD AUTO: 64.5 % (ref 38.7–80)
PLATELET # BLD AUTO: 102 X10E3/UL (ref 140–360)
POTASSIUM SERPL-SCNC: 4.7 MMOL/L (ref 3.5–5.1)
PROT UR-MCNC: 134.3 MG/DL (ref 1–14)
PROTHROMBIN TIME: 25.4 SECONDS (ref 11.9–14.5)
RBC # BLD AUTO: 3.17 X10E6/UL (ref 3.6–5.1)
SODIUM SERPL-SCNC: 132 MMOL/L (ref 136–145)
SODIUM UR-SCNC: < 20 MMOL/L

## 2019-01-01 RX ADMIN — BUDESONIDE AND FORMOTEROL FUMARATE DIHYDRATE SCH EA: 160; 4.5 AEROSOL RESPIRATORY (INHALATION) at 19:00

## 2019-01-01 RX ADMIN — CEFTRIAXONE SCH MLS/HR: 100 INJECTION, POWDER, FOR SOLUTION INTRAVENOUS at 21:18

## 2019-01-01 RX ADMIN — Medication SCH TAB: at 09:53

## 2019-01-01 RX ADMIN — Medication SCH TAB: at 17:00

## 2019-01-01 RX ADMIN — FAMOTIDINE SCH MG: 20 TABLET, FILM COATED ORAL at 22:38

## 2019-01-01 RX ADMIN — LACTULOSE SCH GM: 20 SOLUTION ORAL at 09:53

## 2019-01-01 RX ADMIN — SODIUM CHLORIDE, POTASSIUM CHLORIDE, SODIUM LACTATE AND CALCIUM CHLORIDE SCH MLS/HR: 600; 310; 30; 20 INJECTION, SOLUTION INTRAVENOUS at 15:31

## 2019-01-01 NOTE — CONSULTATION
DATE OF CONSULTATION:    



CHIEF COMPLAINT:  Jaundice, altered mental status.



HISTORY OF PRESENT ILLNESS:  A very pleasant 78-year-old lady coming with 

altered mental status and cirrhosis.  Patient has liver failure and she has 

possible infection.  She also has poor renal function.  Patient is obese 

with probably steatohepatitis because of her cirrhosis.  She also takes 

atorvastatin as one of her medications.



PAST MEDICAL HISTORY:  Obesity, cirrhosis.



MEDICATIONS:  See list.  They include atorvastatin, aspirin, Plavix, 

gabapentin, lactulose.



FAMILY HISTORY:  Noncontributory.



REVIEW OF SYSTEMS:  Patient is little confused.



PHYSICAL EXAMINATION

VITAL SIGNS:  Blood pressure 140/80, pulse 80, temperature 98.

GENERAL:  An obese white lady.  She is jaundiced.

HEART:  Regular rate and rhythm.

ABDOMEN:  Soft, nontender.

EXTREMITIES:  No edema.

NEURO:  Nonfocal.





ASSESSMENT AND PLAN:  Liver cirrhosis, acute-on-chronic liver failure, 

sepsis and medications can be contributors.  Atorvastatin and sertraline 

needs to be discontinued.  Treatment for urinary tract infection is to be 

instituted.  I will recommend at this time to continue current treatment.  

She will need multiple consults including kidney.  At this time, her 

prognosis is very guarded due to her age and multiorgan failure.









DD:  01/01/2019 16:17

DT:  01/01/2019 19:17

Job#:  [id number]

## 2019-01-01 NOTE — NUR
Patient complained feeling uncomfortably due to lots of gas in her stomach. Called Dr Berry MD ordered Pepcid 20mg po QHs at this time.

## 2019-01-01 NOTE — NUR
CALLED DR. CHAU MACIEL MADE HIM AWARE DR. MCKEON, GAVE ME ORDERS TO D/C ATORVASTATIN, AND TO ORDER 
CMP, RECEIVED ORDERS FROM DR. MACIEL TO D/C BMP 1/2/19

## 2019-01-01 NOTE — DIAGNOSTIC IMAGING REPORT
EXAM: CHEST SINGLE (PORTABLE), AP 1 view



INDICATION: Rule out pulmonary edema. 



COMPARISON: Chest radiograph 12/31/2018.



FINDINGS:

LINES/TUBES: None



LUNGS: Lungs are moderately inflated. Mild patchy bibasilar atelectasis. No

evidence of lobar pneumonia or pulmonary edema.



PLEURA: No effusions or pneumothorax.



HEART AND MEDIASTINUM: Possible mild cardiomegaly. Surgical changes of coronary

artery bypass.



BONES AND SOFT TISSUES: Lower cervical spine fixation hardware. Median

sternotomy wires. No acute bony findings.



IMPRESSION:

No acute radiographic abnormality. No evidence of pulmonary edema.



Signed by: Dr. Roxanne Reilly MD on 1/1/2019 2:43 PM

## 2019-01-01 NOTE — NUR
Patient and patient's  requesting to be moved back onto a regular mattress bed. The 
patient complains of being very uncomfortable and unable to move herself. The  
complains that she is uncomfortable in the bed and is unable to assist her properly with her 
ADLs and ROM. Patient and family educated on importance of turning and repositioning to 
prevent wounds and reasons of why placed on rotational bed.

## 2019-01-02 VITALS — SYSTOLIC BLOOD PRESSURE: 118 MMHG | DIASTOLIC BLOOD PRESSURE: 54 MMHG

## 2019-01-02 VITALS — SYSTOLIC BLOOD PRESSURE: 124 MMHG | DIASTOLIC BLOOD PRESSURE: 55 MMHG

## 2019-01-02 VITALS — DIASTOLIC BLOOD PRESSURE: 49 MMHG | SYSTOLIC BLOOD PRESSURE: 121 MMHG

## 2019-01-02 VITALS — DIASTOLIC BLOOD PRESSURE: 51 MMHG | SYSTOLIC BLOOD PRESSURE: 119 MMHG

## 2019-01-02 VITALS — DIASTOLIC BLOOD PRESSURE: 54 MMHG | SYSTOLIC BLOOD PRESSURE: 118 MMHG

## 2019-01-02 VITALS — DIASTOLIC BLOOD PRESSURE: 44 MMHG | SYSTOLIC BLOOD PRESSURE: 115 MMHG

## 2019-01-02 LAB
ALBUMIN SERPL-MCNC: 1.9 G/DL (ref 3.5–5)
ALBUMIN SERPL-MCNC: 1.9 G/DL (ref 3.5–5)
ALBUMIN/GLOB SERPL: 0.6 {RATIO} (ref 0.8–2)
ALP SERPL-CCNC: 183 IU/L (ref 40–150)
ALP SERPL-CCNC: 185 IU/L (ref 40–150)
ALT SERPL-CCNC: 283 IU/L (ref 0–55)
ALT SERPL-CCNC: 286 IU/L (ref 0–55)
ANION GAP SERPL CALC-SCNC: 14.9 MMOL/L (ref 8–16)
ANISOCYTOSIS BLD QL SMEAR: SLIGHT
BASOPHILS # BLD AUTO: 0.1 10*3/UL (ref 0–0.1)
BASOPHILS NFR BLD AUTO: 0.4 % (ref 0–1)
BILIRUB CONJ SERPL-MCNC: 3.1 MG/DL (ref 0–0.5)
BUN SERPL-MCNC: 57 MG/DL (ref 7–26)
BUN/CREAT SERPL: 34 (ref 6–25)
CALCIUM SERPL-MCNC: 8 MG/DL (ref 8.4–10.2)
CHLORIDE SERPL-SCNC: 97 MMOL/L (ref 98–107)
CO2 SERPL-SCNC: 21 MMOL/L (ref 22–29)
DEPRECATED INR PLAS: 2.02
DEPRECATED NEUTROPHILS # BLD AUTO: 8.9 10*3/UL (ref 2.1–6.9)
EGFRCR SERPLBLD CKD-EPI 2021: 30 ML/MIN (ref 60–?)
EOSINOPHIL # BLD AUTO: 0 10*3/UL (ref 0–0.4)
EOSINOPHIL NFR BLD AUTO: 0.2 % (ref 0–6)
ERYTHROCYTE [DISTWIDTH] IN CORD BLOOD: 16.8 % (ref 11.7–14.4)
GLOBULIN PLAS-MCNC: 3.3 G/DL (ref 2.3–3.5)
GLUCOSE SERPLBLD-MCNC: 87 MG/DL (ref 74–118)
HCT VFR BLD AUTO: 31.3 % (ref 34.2–44.1)
HGB BLD-MCNC: 10.4 G/DL (ref 12–16)
HYPOCHROMIA BLD QL SMEAR: SLIGHT
LYMPHOCYTES # BLD: 1.8 10*3/UL (ref 1–3.2)
LYMPHOCYTES NFR BLD AUTO: 13.6 % (ref 18–39.1)
LYMPHOCYTES NFR BLD MANUAL: 11 % (ref 19–48)
MAGNESIUM SERPL-MCNC: 2.3 MG/DL (ref 1.3–2.1)
MCH RBC QN AUTO: 31.7 PG (ref 28–32)
MCHC RBC AUTO-ENTMCNC: 33.2 G/DL (ref 31–35)
MCV RBC AUTO: 95.4 FL (ref 81–99)
MONOCYTES # BLD AUTO: 2 10*3/UL (ref 0.2–0.8)
MONOCYTES NFR BLD AUTO: 15.9 % (ref 4.4–11.3)
MONOCYTES NFR BLD MANUAL: 8 % (ref 3.4–9)
NEUTS SEG NFR BLD AUTO: 69.7 % (ref 38.7–80)
NEUTS SEG NFR BLD MANUAL: 80 % (ref 40–74)
PLAT MORPH BLD: NORMAL
PLATELET # BLD AUTO: 113 X10E3/UL (ref 140–360)
PLATELET # BLD EST: (no result) 10*3/UL
POTASSIUM SERPL-SCNC: 4.9 MMOL/L (ref 3.5–5.1)
PROTHROMBIN TIME: 24.4 SECONDS (ref 11.9–14.5)
RBC # BLD AUTO: 3.28 X10E6/UL (ref 3.6–5.1)
RBC MORPH BLD: NORMAL
SODIUM SERPL-SCNC: 128 MMOL/L (ref 136–145)

## 2019-01-02 RX ADMIN — SODIUM CHLORIDE, POTASSIUM CHLORIDE, SODIUM LACTATE AND CALCIUM CHLORIDE SCH MLS/HR: 600; 310; 30; 20 INJECTION, SOLUTION INTRAVENOUS at 01:55

## 2019-01-02 RX ADMIN — BUDESONIDE AND FORMOTEROL FUMARATE DIHYDRATE SCH EA: 160; 4.5 AEROSOL RESPIRATORY (INHALATION) at 19:00

## 2019-01-02 RX ADMIN — FAMOTIDINE SCH MG: 20 TABLET, FILM COATED ORAL at 21:30

## 2019-01-02 RX ADMIN — TRAMADOL HYDROCHLORIDE PRN MG: 50 TABLET, FILM COATED ORAL at 02:31

## 2019-01-02 RX ADMIN — SODIUM CHLORIDE PRN MG: 900 INJECTION INTRAVENOUS at 02:31

## 2019-01-02 RX ADMIN — BUDESONIDE AND FORMOTEROL FUMARATE DIHYDRATE SCH EA: 160; 4.5 AEROSOL RESPIRATORY (INHALATION) at 07:00

## 2019-01-02 RX ADMIN — SODIUM CHLORIDE PRN MG: 900 INJECTION INTRAVENOUS at 10:10

## 2019-01-02 RX ADMIN — CEFTRIAXONE SCH MLS/HR: 100 INJECTION, POWDER, FOR SOLUTION INTRAVENOUS at 22:00

## 2019-01-02 RX ADMIN — PROMETHAZINE HYDROCHLORIDE PRN MG: 25 INJECTION, SOLUTION INTRAMUSCULAR; INTRAVENOUS at 13:26

## 2019-01-02 RX ADMIN — Medication SCH TAB: at 09:42

## 2019-01-02 RX ADMIN — LACTULOSE SCH GM: 20 SOLUTION ORAL at 09:42

## 2019-01-02 RX ADMIN — Medication SCH TAB: at 16:39

## 2019-01-02 RX ADMIN — SODIUM CHLORIDE, POTASSIUM CHLORIDE, SODIUM LACTATE AND CALCIUM CHLORIDE SCH MLS/HR: 600; 310; 30; 20 INJECTION, SOLUTION INTRAVENOUS at 09:30

## 2019-01-02 NOTE — NUR
PATIENT RESTING IN BED,  AT THE BEDSIDE.  REPORT RECEIVED FROM AM NURSE.  ROUNDS 
DONE.  CALL LIGHT IN REACH. WILL CONTINUE TO MONITOR. CONTINUE RECEIVING LASIX AS ORDERED.

## 2019-01-02 NOTE — NUR
pt c/o N/V, states not sure if zofran helping or not. admin dose. MD updated that pt sodium 
levels decreased, liver and kidney labs worsening. per pt , they were not wanting to 
be on IVF bc of pt edema and concern about pulmonary. obtained order for phenergan in 
addition to zofran, explained need for IVF to assist with NA levels per MD, cxr ordered to 
monitor for possible pulm.edema. will continue to monitor.

## 2019-01-02 NOTE — DIAGNOSTIC IMAGING REPORT
EXAM: CHEST 2 VIEWS



INDICATION: Rule out pulmonary edema. 



COMPARISON: Chest radiograph 1/1/2019. 



FINDINGS:

LINES/TUBES: None



LUNGS: Lungs are moderately inflated. There are mild perihilar and interstitial

opacities. Mild patchy opacities at the left lung base. 



PLEURA: No effusions or pneumothorax.



HEART AND MEDIASTINUM: Mild cardiomegaly. Surgical changes of coronary artery

bypass.



BONES AND SOFT TISSUES: Lower cervical spine fixation hardware. Median

sternotomy wires. No acute bony findings.



IMPRESSION:

Mild cardiomegaly with mild pulmonary interstitial edema. 



Patchy left basilar opacity, most likely atelectasis. 



Signed by: Dr. Roxanne Reilly MD on 1/2/2019 1:04 PM

## 2019-01-02 NOTE — DIAGNOSTIC IMAGING REPORT
EXAM: Renal Ultrasound

INDICATION:        

^RENAL FAILURE  

COMPARISON: CT dated 12/30/2018 

TECHNIQUE: Transverse and longitudinal images of the kidneys and bladder were

obtained.  



FINDINGS:     

Limited study due to body habitus.

Right Kidney: 

     Size: 11.1 cm

     Echogenicity: Normal   

     Parenchymal thickness: Normal 

     Collecting system: No hydronephrosis

     Stones: None

     Cyst/Mass: 2.3 x 2.1 x 1.9 cm midpole cyst.



Left Kidney: 

     Size: 10.2 cm

     Echogenicity: Normal   

     Parenchymal thickness: Normal 

     Collecting system: No hydronephrosis

     Stones: None

     Cyst/Mass: None



Bladder: 

Decompressed by a Mark catheter in place.



IMPRESSION:

Limited study due to body habitus.

Unremarkable renal ultrasound.

2.3 cm right renal midpole cyst.



Signed by: Dr. Leighton Lua MD on 1/2/2019 4:22 PM

## 2019-01-02 NOTE — NUR
Patient complained severe headache and nausea at this time. Called Dr. Smart to informed about 
patient condition. MD ordered tramadol 25mg po q6h PRN  for moderate pain and IV morphine 
2mg q4h PRN for severe pain at his time.

## 2019-01-03 VITALS — SYSTOLIC BLOOD PRESSURE: 119 MMHG | DIASTOLIC BLOOD PRESSURE: 51 MMHG

## 2019-01-03 VITALS — SYSTOLIC BLOOD PRESSURE: 111 MMHG | DIASTOLIC BLOOD PRESSURE: 48 MMHG

## 2019-01-03 VITALS — DIASTOLIC BLOOD PRESSURE: 50 MMHG | SYSTOLIC BLOOD PRESSURE: 124 MMHG

## 2019-01-03 VITALS — DIASTOLIC BLOOD PRESSURE: 78 MMHG | SYSTOLIC BLOOD PRESSURE: 94 MMHG

## 2019-01-03 VITALS — DIASTOLIC BLOOD PRESSURE: 54 MMHG | SYSTOLIC BLOOD PRESSURE: 101 MMHG

## 2019-01-03 VITALS — SYSTOLIC BLOOD PRESSURE: 124 MMHG | DIASTOLIC BLOOD PRESSURE: 58 MMHG

## 2019-01-03 VITALS — SYSTOLIC BLOOD PRESSURE: 94 MMHG | DIASTOLIC BLOOD PRESSURE: 78 MMHG

## 2019-01-03 VITALS — SYSTOLIC BLOOD PRESSURE: 99 MMHG | DIASTOLIC BLOOD PRESSURE: 46 MMHG

## 2019-01-03 VITALS — SYSTOLIC BLOOD PRESSURE: 106 MMHG | DIASTOLIC BLOOD PRESSURE: 58 MMHG

## 2019-01-03 LAB
ALBUMIN SERPL-MCNC: 1.9 G/DL (ref 3.5–5)
ALBUMIN/GLOB SERPL: 0.6 {RATIO} (ref 0.8–2)
ALP SERPL-CCNC: 207 IU/L (ref 40–150)
ALT SERPL-CCNC: 306 IU/L (ref 0–55)
ANION GAP SERPL CALC-SCNC: 14.6 MMOL/L (ref 8–16)
BACTERIA URNS QL MICRO: (no result) /HPF
BASOPHILS # BLD AUTO: 0.1 10*3/UL (ref 0–0.1)
BASOPHILS NFR BLD AUTO: 0.4 % (ref 0–1)
BILIRUB UR QL: NEGATIVE
BUN SERPL-MCNC: 62 MG/DL (ref 7–26)
BUN/CREAT SERPL: 31 (ref 6–25)
CALCIUM SERPL-MCNC: 7.9 MG/DL (ref 8.4–10.2)
CHLORIDE SERPL-SCNC: 95 MMOL/L (ref 98–107)
CLARITY UR: (no result)
CO2 SERPL-SCNC: 22 MMOL/L (ref 22–29)
COLOR UR: YELLOW
DEPRECATED INR PLAS: 1.93
DEPRECATED NEUTROPHILS # BLD AUTO: 7.5 10*3/UL (ref 2.1–6.9)
DEPRECATED RBC URNS MANUAL-ACNC: (no result) /HPF (ref 0–5)
EGFRCR SERPLBLD CKD-EPI 2021: 24 ML/MIN (ref 60–?)
EOSINOPHIL # BLD AUTO: 0.2 10*3/UL (ref 0–0.4)
EOSINOPHIL NFR BLD AUTO: 1.6 % (ref 0–6)
EOSINOPHIL NFR BLD MANUAL: 3 % (ref 0–7)
EPI CELLS URNS QL MICRO: (no result) /LPF
ERYTHROCYTE [DISTWIDTH] IN CORD BLOOD: 16.9 % (ref 11.7–14.4)
GLOBULIN PLAS-MCNC: 3.4 G/DL (ref 2.3–3.5)
GLUCOSE SERPLBLD-MCNC: 97 MG/DL (ref 74–118)
HCT VFR BLD AUTO: 30.9 % (ref 34.2–44.1)
HGB BLD-MCNC: 10.5 G/DL (ref 12–16)
KETONES UR QL STRIP.AUTO: NEGATIVE
LEUKOCYTE ESTERASE UR QL STRIP.AUTO: (no result)
LYMPHOCYTES # BLD: 1.9 10*3/UL (ref 1–3.2)
LYMPHOCYTES NFR BLD AUTO: 16.1 % (ref 18–39.1)
LYMPHOCYTES NFR BLD MANUAL: 11 % (ref 19–48)
MCH RBC QN AUTO: 31.9 PG (ref 28–32)
MCHC RBC AUTO-ENTMCNC: 34 G/DL (ref 31–35)
MCV RBC AUTO: 93.9 FL (ref 81–99)
MONOCYTES # BLD AUTO: 2.1 10*3/UL (ref 0.2–0.8)
MONOCYTES NFR BLD AUTO: 17.6 % (ref 4.4–11.3)
MONOCYTES NFR BLD MANUAL: 9 % (ref 3.4–9)
NEUTS SEG NFR BLD AUTO: 64 % (ref 38.7–80)
NEUTS SEG NFR BLD MANUAL: 77 % (ref 40–74)
NITRITE UR QL STRIP.AUTO: NEGATIVE
PLAT MORPH BLD: NORMAL
PLATELET # BLD AUTO: 111 X10E3/UL (ref 140–360)
PLATELET # BLD EST: (no result) 10*3/UL
POTASSIUM SERPL-SCNC: 4.6 MMOL/L (ref 3.5–5.1)
PROT UR QL STRIP.AUTO: NEGATIVE
PROTHROMBIN TIME: 23.6 SECONDS (ref 11.9–14.5)
RBC # BLD AUTO: 3.29 X10E6/UL (ref 3.6–5.1)
RBC MORPH BLD: NORMAL
SODIUM SERPL-SCNC: 127 MMOL/L (ref 136–145)
SP GR UR STRIP: 1.02 (ref 1.01–1.02)
UROBILINOGEN UR STRIP-MCNC: 0.2 MG/DL (ref 0.2–1)
WBC #/AREA URNS HPF: (no result) /HPF (ref 0–5)

## 2019-01-03 PROCEDURE — 02HV33Z INSERTION OF INFUSION DEVICE INTO SUPERIOR VENA CAVA, PERCUTANEOUS APPROACH: ICD-10-PCS | Performed by: RADIOLOGY

## 2019-01-03 RX ADMIN — CEFTRIAXONE SCH MLS/HR: 100 INJECTION, POWDER, FOR SOLUTION INTRAVENOUS at 20:37

## 2019-01-03 RX ADMIN — LACTULOSE SCH GM: 20 SOLUTION ORAL at 09:21

## 2019-01-03 RX ADMIN — FAMOTIDINE SCH MG: 20 TABLET, FILM COATED ORAL at 20:37

## 2019-01-03 RX ADMIN — SODIUM CHLORIDE SCH MLS/HR: 9 INJECTION, SOLUTION INTRAVENOUS at 20:37

## 2019-01-03 RX ADMIN — LACTULOSE SCH GM: 20 SOLUTION ORAL at 09:00

## 2019-01-03 RX ADMIN — BUDESONIDE AND FORMOTEROL FUMARATE DIHYDRATE SCH EA: 160; 4.5 AEROSOL RESPIRATORY (INHALATION) at 07:00

## 2019-01-03 RX ADMIN — Medication SCH TAB: at 09:21

## 2019-01-03 RX ADMIN — Medication PRN MG: at 23:06

## 2019-01-03 RX ADMIN — LACTULOSE SCH GM: 20 SOLUTION ORAL at 16:41

## 2019-01-03 RX ADMIN — TRAMADOL HYDROCHLORIDE PRN MG: 50 TABLET, FILM COATED ORAL at 21:03

## 2019-01-03 RX ADMIN — SODIUM CHLORIDE SCH MLS/HR: 9 INJECTION, SOLUTION INTRAVENOUS at 13:15

## 2019-01-03 RX ADMIN — SODIUM CHLORIDE PRN MG: 900 INJECTION INTRAVENOUS at 23:06

## 2019-01-03 RX ADMIN — Medication SCH TAB: at 16:41

## 2019-01-03 RX ADMIN — BUDESONIDE AND FORMOTEROL FUMARATE DIHYDRATE SCH EA: 160; 4.5 AEROSOL RESPIRATORY (INHALATION) at 19:05

## 2019-01-03 NOTE — DIAGNOSTIC IMAGING REPORT
EXAM: CHEST XRAY LINE PLACEMENT



INDICATION: Line placement.



COMPARISON: Chest radiograph 1/2/2019.



FINDINGS:

LINES/TUBES: Interval placement of a right IJ central line with catheter tip

terminating at the expected location of the lower SVC.



LUNGS: Lungs are moderately inflated. There are mild perihilar and interstitial

opacities. No evidence of lobar consolidation.



PLEURA: No effusions or pneumothorax.



HEART AND MEDIASTINUM: Mild cardiomegaly. Surgical changes of coronary artery

bypass.



BONES AND SOFT TISSUES: Lower cervical spine fixation hardware. Median

sternotomy wires. No acute bony findings.



IMPRESSION:

Interval placement of a right IJ central line with catheter tip terminating at

satisfactory position in the expected location of the lower SVC. No evidence of

pneumothorax. 



Mild cardiomegaly with mild pulmonary interstitial edema. 



Signed by: Dr. Roxanne Reilly MD on 1/3/2019 4:06 PM

## 2019-01-03 NOTE — DIAGNOSTIC IMAGING REPORT
Procedure: Right internal jugular central venous catheter placement with

ultrasound guidance



: Dr. Roxanne Reilly



Pre-operative diagnosis: Requiring central venous access 

Post-operative diagnosis: Status post central venous access 



Conscious Sedation: None

The patient's heart rate and pulse oximetry were continuously monitored by the

nurse.



Additional Medications: Lidocaine 1% for local anesthesia

Fluoroscopy time:  0

Dose-area Product:   0 mGycm2.

Frontal Air Kerma: 0

Contrast used: 0

Estimated blood loss: Minimal

Specimens: None

Implants: 7 Slovak 16 cm triple-lumen central venous catheter



DISCUSSION:

Informed consent was obtained and documented in the medical record.



The patient was placed in the supine position. Preliminary sonographic

evaluation of the right neck confirmed a patent and compressible right internal

jugular vein. The neck was then prepped and draped in a standard sterile

fashion.



Subsequently, 1% lidocaine was infiltrated into the skin and subcutaneous

tissues for local anesthesia. Then under continuous sonographic guidance, a

micropuncture needle was advanced into the right internal jugular vein. A

permanent sonographic image was stored in the medical record. An .018'' wire

was placed and upsized to an .035'' Amplatz wire using micropuncture sheath.

The .035'' wire was advanced centrally with continuous cardiac rhythm

monitoring. The micropuncture  sheath was removed. The tract was dilated. Then,

a 7 Slovak, 16 cm triple-lumen non-tunneled central venous catheter was

advanced over the wire. The wire was then removed. Each lumen was tested and

showed adequate bidirectional flow. The catheter was secured to the skin with

Monocryl, flushed with sterile saline, and covered by a sterile dressing. The

patient tolerated the procedure well without immediate complication. 



FINDINGS:

Patent right internal jugular vein.



IMPRESSION:

Placement of a 7 Slovak x 16 cm triple-lumen central venous catheter by a right

internal jugular approach under sonographic guidance.



A postprocedure chest radiograph will be obtained to confirm line positioning

prior to use.



Signed by: Dr. Roxanne Reilly MD on 1/3/2019 4:08 PM

## 2019-01-03 NOTE — NUR
JOSHUA COOMBS CALLED  REGARDING MD ORDERS TO TRANSFER TO [facility]. PER MD ORDER PATIENT TO TRANSFER DUE TO HEPATORENAL SYNDROME. RN NOTIFIED 
THAT THERE NEEDS TO BE A REASON NOT JUST A DIAGNOSIS. RN STATES MD SAYS THAT HEPATORENAL 
SYNDROME IS THE REASON FOR TRANSFER. NO PROCEDURE IS PENDING. RN CREATES ORDER WITH LOC 
REQUESTED, DX, TRANSFER HOSPITAL AND STATES THE [facility]



CM INITIATES TRANSFER BY CALLING [facility] @ [phone]. ORDER, HISTORY & 
PHYSICAL, PROGRESS NOTES AND FACESHEET SENT TO TRANSFER CENTER @ [phone] AS REQUESTED 
BY TRANSFER .

## 2019-01-03 NOTE — DIAGNOSTIC IMAGING REPORT
Procedure: Right internal jugular central venous catheter placement with

ultrasound guidance



: Dr. Roxanne Reilly



Pre-operative diagnosis: Requiring central venous access 

Post-operative diagnosis: Status post central venous access 



Conscious Sedation: None

The patient's heart rate and pulse oximetry were continuously monitored by the

nurse.



Additional Medications: Lidocaine 1% for local anesthesia

Fluoroscopy time:  0

Dose-area Product:   0 mGycm2.

Frontal Air Kerma: 0

Contrast used: 0

Estimated blood loss: Minimal

Specimens: None

Implants: 7 Croatian 16 cm triple-lumen central venous catheter



DISCUSSION:

Informed consent was obtained and documented in the medical record.



The patient was placed in the supine position. Preliminary sonographic

evaluation of the right neck confirmed a patent and compressible right internal

jugular vein. The neck was then prepped and draped in a standard sterile

fashion.



Subsequently, 1% lidocaine was infiltrated into the skin and subcutaneous

tissues for local anesthesia. Then under continuous sonographic guidance, a

micropuncture needle was advanced into the right internal jugular vein. A

permanent sonographic image was stored in the medical record. An .018'' wire

was placed and upsized to an .035'' Amplatz wire using micropuncture sheath.

The .035'' wire was advanced centrally with continuous cardiac rhythm

monitoring. The micropuncture  sheath was removed. The tract was dilated. Then,

a 7 Croatian, 16 cm triple-lumen non-tunneled central venous catheter was

advanced over the wire. The wire was then removed. Each lumen was tested and

showed adequate bidirectional flow. The catheter was secured to the skin with

Monocryl, flushed with sterile saline, and covered by a sterile dressing. The

patient tolerated the procedure well without immediate complication. 



FINDINGS:

Patent right internal jugular vein.



IMPRESSION:

Placement of a 7 Croatian x 16 cm triple-lumen central venous catheter by a right

internal jugular approach under sonographic guidance.



A postprocedure chest radiograph will be obtained to confirm line positioning

prior to use.



Signed by: Dr. Roxanne Reilly MD on 1/3/2019 4:08 PM

## 2019-01-03 NOTE — NUR
Report received from AM RN Samantha. Patient received alert/oriented x2,pleasant and little 
confused resting on her bed. Denied turn her position.No respiratory distress noted. 
Respiration even and unlabored. Patient's  at the bedside. Bed in lower 
position,locked.Call bell within reach. Will continue to monitor.

## 2019-01-03 NOTE — DIAGNOSTIC IMAGING REPORT
Procedure: Right internal jugular central venous catheter placement with

ultrasound guidance



: Dr. Roxanne Reilly



Pre-operative diagnosis: Requiring central venous access 

Post-operative diagnosis: Status post central venous access 



Conscious Sedation: None

The patient's heart rate and pulse oximetry were continuously monitored by the

nurse.



Additional Medications: Lidocaine 1% for local anesthesia

Fluoroscopy time:  0

Dose-area Product:   0 mGycm2.

Frontal Air Kerma: 0

Contrast used: 0

Estimated blood loss: Minimal

Specimens: None

Implants: 7 Belarusian 16 cm triple-lumen central venous catheter



DISCUSSION:

Informed consent was obtained and documented in the medical record.



The patient was placed in the supine position. Preliminary sonographic

evaluation of the right neck confirmed a patent and compressible right internal

jugular vein. The neck was then prepped and draped in a standard sterile

fashion.



Subsequently, 1% lidocaine was infiltrated into the skin and subcutaneous

tissues for local anesthesia. Then under continuous sonographic guidance, a

micropuncture needle was advanced into the right internal jugular vein. A

permanent sonographic image was stored in the medical record. An .018'' wire

was placed and upsized to an .035'' Amplatz wire using micropuncture sheath.

The .035'' wire was advanced centrally with continuous cardiac rhythm

monitoring. The micropuncture  sheath was removed. The tract was dilated. Then,

a 7 Belarusian, 16 cm triple-lumen non-tunneled central venous catheter was

advanced over the wire. The wire was then removed. Each lumen was tested and

showed adequate bidirectional flow. The catheter was secured to the skin with

Monocryl, flushed with sterile saline, and covered by a sterile dressing. The

patient tolerated the procedure well without immediate complication. 



FINDINGS:

Patent right internal jugular vein.



IMPRESSION:

Placement of a 7 Belarusian x 16 cm triple-lumen central venous catheter by a right

internal jugular approach under sonographic guidance.



A postprocedure chest radiograph will be obtained to confirm line positioning

prior to use.



Signed by: Dr. Roxanne Reilly MD on 1/3/2019 4:08 PM

## 2019-01-04 VITALS — DIASTOLIC BLOOD PRESSURE: 56 MMHG | SYSTOLIC BLOOD PRESSURE: 101 MMHG

## 2019-01-04 VITALS — DIASTOLIC BLOOD PRESSURE: 48 MMHG | SYSTOLIC BLOOD PRESSURE: 107 MMHG

## 2019-01-04 VITALS — SYSTOLIC BLOOD PRESSURE: 92 MMHG | DIASTOLIC BLOOD PRESSURE: 58 MMHG

## 2019-01-04 VITALS — DIASTOLIC BLOOD PRESSURE: 44 MMHG | SYSTOLIC BLOOD PRESSURE: 97 MMHG

## 2019-01-04 VITALS — DIASTOLIC BLOOD PRESSURE: 58 MMHG | SYSTOLIC BLOOD PRESSURE: 92 MMHG

## 2019-01-04 LAB
ALBUMIN SERPL-MCNC: 1.8 G/DL (ref 3.5–5)
ALP SERPL-CCNC: 199 IU/L (ref 40–150)
ALT SERPL-CCNC: 313 IU/L (ref 0–55)
ANION GAP SERPL CALC-SCNC: 14.4 MMOL/L (ref 8–16)
ANISOCYTOSIS BLD QL SMEAR: (no result)
BASOPHILS # BLD AUTO: 0 10*3/UL (ref 0–0.1)
BASOPHILS NFR BLD AUTO: 0.4 % (ref 0–1)
BILIRUB CONJ SERPL-MCNC: 3 MG/DL (ref 0–0.5)
BUN SERPL-MCNC: 66 MG/DL (ref 7–26)
BUN/CREAT SERPL: 31 (ref 6–25)
CALCIUM SERPL-MCNC: 7.7 MG/DL (ref 8.4–10.2)
CHLORIDE SERPL-SCNC: 95 MMOL/L (ref 98–107)
CO2 SERPL-SCNC: 23 MMOL/L (ref 22–29)
DEPRECATED INR PLAS: 2.01
DEPRECATED NEUTROPHILS # BLD AUTO: 6.9 10*3/UL (ref 2.1–6.9)
EGFRCR SERPLBLD CKD-EPI 2021: 23 ML/MIN (ref 60–?)
EOSINOPHIL # BLD AUTO: 0.1 10*3/UL (ref 0–0.4)
EOSINOPHIL NFR BLD AUTO: 0.8 % (ref 0–6)
ERYTHROCYTE [DISTWIDTH] IN CORD BLOOD: 16.9 % (ref 11.7–14.4)
GLUCOSE SERPLBLD-MCNC: 76 MG/DL (ref 74–118)
HCT VFR BLD AUTO: 30.4 % (ref 34.2–44.1)
HGB BLD-MCNC: 10.2 G/DL (ref 12–16)
LYMPHOCYTES # BLD: 1.3 10*3/UL (ref 1–3.2)
LYMPHOCYTES NFR BLD AUTO: 12.8 % (ref 18–39.1)
LYMPHOCYTES NFR BLD MANUAL: 13 % (ref 19–48)
MCH RBC QN AUTO: 31.8 PG (ref 28–32)
MCHC RBC AUTO-ENTMCNC: 33.6 G/DL (ref 31–35)
MCV RBC AUTO: 94.7 FL (ref 81–99)
MONOCYTES # BLD AUTO: 1.6 10*3/UL (ref 0.2–0.8)
MONOCYTES NFR BLD AUTO: 16.3 % (ref 4.4–11.3)
MONOCYTES NFR BLD MANUAL: 9 % (ref 3.4–9)
NEUTS BAND NFR BLD MANUAL: 2 %
NEUTS SEG NFR BLD AUTO: 69.5 % (ref 38.7–80)
NEUTS SEG NFR BLD MANUAL: 76 % (ref 40–74)
PLAT MORPH BLD: NORMAL
PLATELET # BLD AUTO: 91 X10E3/UL (ref 140–360)
PLATELET # BLD EST: (no result) 10*3/UL
POIKILOCYTOSIS BLD QL SMEAR: (no result)
POTASSIUM SERPL-SCNC: 4.4 MMOL/L (ref 3.5–5.1)
PROTHROMBIN TIME: 24.3 SECONDS (ref 11.9–14.5)
RBC # BLD AUTO: 3.21 X10E6/UL (ref 3.6–5.1)
RBC MORPH BLD: NORMAL
SODIUM SERPL-SCNC: 128 MMOL/L (ref 136–145)

## 2019-01-04 RX ADMIN — LACTULOSE SCH GM: 20 SOLUTION ORAL at 09:00

## 2019-01-04 RX ADMIN — SODIUM CHLORIDE SCH MLS/HR: 9 INJECTION, SOLUTION INTRAVENOUS at 06:00

## 2019-01-04 RX ADMIN — SODIUM CHLORIDE SCH MLS/HR: 9 INJECTION, SOLUTION INTRAVENOUS at 01:10

## 2019-01-04 RX ADMIN — SODIUM CHLORIDE SCH MLS/HR: 9 INJECTION, SOLUTION INTRAVENOUS at 11:15

## 2019-01-04 RX ADMIN — Medication SCH TAB: at 09:00

## 2019-01-04 RX ADMIN — PROMETHAZINE HYDROCHLORIDE PRN MG: 25 INJECTION, SOLUTION INTRAMUSCULAR; INTRAVENOUS at 02:45

## 2019-01-04 RX ADMIN — Medication PRN MG: at 02:56

## 2019-01-04 RX ADMIN — BUDESONIDE AND FORMOTEROL FUMARATE DIHYDRATE SCH EA: 160; 4.5 AEROSOL RESPIRATORY (INHALATION) at 07:00

## 2019-01-04 NOTE — NUR
Called Dr. CHAU Smart made him aware of patient's sudden changes in heart rate 120's down to 109 
with a blood pressure of  101/56,  patient continuing in atrial fibrillation. Received 
orders to give Lopressor 2.5mg IVP, x1 dose, and decrease Morphine to 1mg IVP.

## 2019-01-04 NOTE — NUR
Called Dr. Smart asked him about home medications received orders to stop all home 
medications, to continue only with inpatient medications. Send transfer MAR

## 2019-01-04 NOTE — NUR
Patient had running A-fib heart heart below 125 since 0425. EKG shows a-fib with RVR, heart 
rate 111. Called  to notified about Patient rhythm and condition. MD said keep 
continue to monitor at this time.

## 2019-01-04 NOTE — NUR
PATIENT TRANSFERRED TO [facility]. LEFT UNIT IN STRETCHER VIA 
AMBULANCE ACCOMPANIED BY HER .

## 2019-01-04 NOTE — NUR
MOT RECEIVED FOR PATIENT TO BE TRANSFERRED TO ACUTE CARE HOSPITAL:



[facility]

Address: [address] [place] [address], [place] [address]

CALL REPORT TO: [phone]

SPEAK WITH CECELIA JUSTIN

ROOM [id number] LOCATED IN [place] [address]

ACCEPTING PHYSICIAN: DR. GRACIE MCDANIEL, RN NOTIFIED. MOT COMPLETED AND GIVEN TO RN. PATIENT  REQUESTS TO RIDE IN 
AMBULANCE BUT NOTIFIED THAT IT IS UP TO THE AMBULANCE COMPANY.  VERBALLY UNDERSTOOD.

## 2019-01-04 NOTE — NUR
Called Dr. AMY Pennington made him aware of patient's blood pressure, trending down 79/67 hr 120,  
78/57 hr 120, 84/56 hr 110, received orders to stop Lasix drip, and to give Albumen 5% in 
500mls and infuse over 1 hour.

## 2020-01-01 NOTE — NUR
Problem: Oxygenation/Respiratory Function  Goal: Optimized respiratory function and gas exchange  2020 1952 by Omaira Horan, RN  Outcome: Outcome Met, Continue evaluating goal progress toward completion  2020 1949 by Omaira Horan RN  Outcome: Outcome Met, Continue evaluating goal progress toward completion   Remains on 25% hfnc at 3.0 liters, significant bradycardia/desaturation episodes noted  recovers with mild tactile stimulation , see flowsheet.  Continue  plan of care  and  A/B/D monitoring.   POSITIONED PATIENT TO THE LEFT SIDE, SECURED WITH PILLOW. PATIENT REFUSE TO BE TURN, 
DISCUSSED THE BENEFITS AND THE HARM OF NOT TURNING EVERY TWO HOURS ESPECIALLY SINCE SHE'S 
NOT MOBILE.   WANTS HIS WIFE COMFORTABLE AND REFUSE TO HAVE HER TURN, STRESSED THE 
ISSUE CONCERNING THE HARM AND RISK OF NOT TURNING, PATIENT AND  CONTINUE TO REFUSE.  
PILLOW REMOVED FROM PATIENTS BACK, WILL CONTINUE TO MONITOR.